# Patient Record
Sex: FEMALE | Race: WHITE | Employment: FULL TIME | URBAN - METROPOLITAN AREA
[De-identification: names, ages, dates, MRNs, and addresses within clinical notes are randomized per-mention and may not be internally consistent; named-entity substitution may affect disease eponyms.]

---

## 2018-02-16 ENCOUNTER — TRANSFERRED RECORDS (OUTPATIENT)
Dept: HEALTH INFORMATION MANAGEMENT | Facility: CLINIC | Age: 48
End: 2018-02-16

## 2018-10-19 ENCOUNTER — TRANSFERRED RECORDS (OUTPATIENT)
Dept: HEALTH INFORMATION MANAGEMENT | Facility: CLINIC | Age: 48
End: 2018-10-19

## 2019-06-26 ENCOUNTER — HOSPITAL ENCOUNTER (OUTPATIENT)
Facility: CLINIC | Age: 49
End: 2019-06-26
Attending: COLON & RECTAL SURGERY | Admitting: COLON & RECTAL SURGERY
Payer: COMMERCIAL

## 2019-09-06 RX ORDER — LIDOCAINE 40 MG/G
CREAM TOPICAL
Status: CANCELLED | OUTPATIENT
Start: 2019-09-06

## 2019-09-06 RX ORDER — ONDANSETRON 2 MG/ML
4 INJECTION INTRAMUSCULAR; INTRAVENOUS
Status: CANCELLED | OUTPATIENT
Start: 2019-09-06

## 2019-09-30 ENCOUNTER — TRANSFERRED RECORDS (OUTPATIENT)
Dept: HEALTH INFORMATION MANAGEMENT | Facility: CLINIC | Age: 49
End: 2019-09-30

## 2019-11-13 ENCOUNTER — TRANSFERRED RECORDS (OUTPATIENT)
Dept: HEALTH INFORMATION MANAGEMENT | Facility: CLINIC | Age: 49
End: 2019-11-13

## 2019-11-14 ENCOUNTER — TELEPHONE (OUTPATIENT)
Dept: SURGERY | Facility: CLINIC | Age: 49
End: 2019-11-14

## 2019-11-14 NOTE — TELEPHONE ENCOUNTER
Writer called and spoke with patient. Patient added to Dr. Yuan's schedule tomorrow 10/15/19 at 3:30pm. Patient is aware that she is be added on to a full clinic and may have a bit of a wait. Patient verbalized understanding.    Ruth Horn LPN

## 2019-11-14 NOTE — TELEPHONE ENCOUNTER
Spoke with Joyce regarding scheduling consult with Dr. Yuan. Offered appt on 11/20/19. Joyce states Dr. Dewitt would like pt seen earlier than this. Explained that we may be able to fit pt in earlier at an alternate location. Request will be made to Dr. Yuan and this team. Nakia CAMARENA RNCC

## 2019-11-14 NOTE — TELEPHONE ENCOUNTER
[facility] Call Center    Phone Message    May a detailed message be left on voicemail: yes    Reason for Call: Other: Joyce from [facility] calling to try an appointment for Jessica with Dr. Yuan as soon as possible. Jessica has a large defect in her scalp from a mohs procedure to remove basal cell skin cancer that was extensive. The final defect size is 5.1 cm by 5.2 cm on Jessica's left frontal scalp. Dr. Gilbert Dewitt says Jessica needs tissue expansion to be able to close. Please give Joyce a call back at [phone] at your earliest convenience to discuss. Joyce was given the fax number to send over records and said she would do so today (11/14/19).      Action Taken: Message routed to:  [facility] ([facility]  Plastics

## 2019-11-15 ENCOUNTER — OFFICE VISIT (OUTPATIENT)
Dept: SURGERY | Facility: CLINIC | Age: 49
End: 2019-11-15
Payer: COMMERCIAL

## 2019-11-15 VITALS — WEIGHT: 128.2 LBS | HEIGHT: 62 IN | BODY MASS INDEX: 23.59 KG/M2

## 2019-11-15 DIAGNOSIS — L98.8 MOHS DEFECT: Primary | ICD-10-CM

## 2019-11-15 DIAGNOSIS — Z98.890 MOHS DEFECT: Primary | ICD-10-CM

## 2019-11-15 PROCEDURE — 99203 OFFICE O/P NEW LOW 30 MIN: CPT | Performed by: PLASTIC SURGERY

## 2019-11-15 RX ORDER — CEFAZOLIN SODIUM 1 G/50ML
1 INJECTION, SOLUTION INTRAVENOUS SEE ADMIN INSTRUCTIONS
Status: CANCELLED | OUTPATIENT
Start: 2019-11-15

## 2019-11-15 RX ORDER — OXYCODONE HYDROCHLORIDE 5 MG/1
5 CAPSULE ORAL EVERY 4 HOURS PRN
COMMUNITY

## 2019-11-15 RX ORDER — CEFAZOLIN SODIUM 2 G/50ML
2 SOLUTION INTRAVENOUS
Status: CANCELLED | OUTPATIENT
Start: 2019-11-15

## 2019-11-15 RX ORDER — TRAZODONE HYDROCHLORIDE 300 MG/1
TABLET ORAL
COMMUNITY

## 2019-11-15 RX ORDER — SULFAMETHOXAZOLE AND TRIMETHOPRIM 200; 40 MG/5ML; MG/5ML
10 SUSPENSION ORAL 2 TIMES DAILY
COMMUNITY

## 2019-11-15 ASSESSMENT — MIFFLIN-ST. JEOR: SCORE: 1164.76

## 2019-11-15 NOTE — LETTER
11/15/2019         RE: Jessica Nickerson  [address] [place] [id number]        Dear Colleague,    Thank you for referring your patient, Jessica Nickerson, to the [facility]. Please see a copy of my visit note below.    PRESENTING COMPLAINT:  Forehead Mohs defect.      HISTORY OF PRESENTING COMPLAINT:  Ms. Nickerson is 48 years old.  She had Mohs surgery to her mid upper forehead on Wednesday for basal cell carcinoma.  She has a large area of opening and here to have it reconstructed.      PAST MEDICAL HISTORY:  Nil.      PAST SURGICAL HISTORY:  Breast reduction, abdominoplasty, T&A and wisdom teeth extraction.      MEDICATIONS:  Oxycodone, Bactrim and trazodone.      ALLERGIES:  Nil.      SOCIAL HISTORY:  Smokes about 1/2 pack a month.  Used to smoke a pack a day for about 20 years, quit in 08/2019.  Does not drink alcohol.  Lives in [place], is a manager.      REVIEW OF SYSTEMS:  Denies chest pain, shortness of breath, MI, CVA, DVT and PE.      PHYSICAL EXAMINATION:  Vital signs are stable.  She is afebrile, in no obvious distress.  She is 5 feet 2 inches, 128 pounds, BMI 23 kg/m2.  On examination of her forehead, she has about a 6 x 6 cm rounded defect in the frontal hairline just left of midline onto the forehead as well.  Underlying pericranium and frontalis muscle is visible.  No infection.      ASSESSMENT AND PLAN:  Based on above findings, a diagnosis of forehead Mohs defect was made.  In my opinion, the best course of action is to proceed with a full-thickness graft from her right groin through her abdominoplasty incision and get the wound healed, then in about 3-6 months proceed with expansion of her scalp to get hair bearing scalp into the area by removing the skin graft.  This staged process was explained in detail.  Other options include a scalp flap now with a donor site skin graft and the same procedure as discussed above down the road.  She wants to proceed with a skin graft  and then subsequent scalp expansion.  All risks, benefits, alternatives of the procedure including pain, infection, bleeding, scarring, asymmetry, seromas, hematomas, loss of the graft, requirement of further surgeries, prominent scar, hypertrophic scar, keloid scar, numbness, asymmetry of her brows and forehead, DVT, PE, MI, CVA, pneumonia, renal failure and death were explained.  We will add her on to Monday.  All questions were answered.  She was happy with the visit.      Total time spent with patient 30 minutes, more than half was counseling.         Again, thank you for allowing me to participate in the care of your patient.        Sincerely,        MICHELINE Yuan MD

## 2019-11-15 NOTE — NURSING NOTE
Jessica Nickerson's goals for this visit include:   Chief Complaint   Patient presents with     Consult     MOH'S procedure, open wound       She requests these members of her care team be copied on today's visit information: no    PCP: No Ref-Primary, Physician    Referring Provider:  No referring provider defined for this encounter.    There were no vitals taken for this visit.    Do you need any medication refills at today's visit? No    Ruth Horn LPN

## 2019-11-16 ENCOUNTER — ANESTHESIA EVENT (OUTPATIENT)
Dept: SURGERY | Facility: AMBULATORY SURGERY CENTER | Age: 49
End: 2019-11-16

## 2019-11-16 NOTE — ANESTHESIA PREPROCEDURE EVALUATION
"Anesthesia Pre-Procedure Evaluation    Patient: Jessica Nickerson   MRN:     [id number] Gender:   female   Age:    48 year old :      1970        Preoperative Diagnosis: Mohs defect [M95.9]   Procedure(s):  FOREHEAD WOUND CLOSURE WITH SKIN GRAFT (RIGHT GROIN)     No past medical history on file.   No past surgical history on file.                PHYSICAL EXAM:   Mental Status/Neuro: A/A/O   Airway: Facies: Feasible  Mallampati: I  Mouth/Opening: Full  TM distance: > 6 cm  Neck ROM: Full   Respiratory: Auscultation: CTAB     Resp. Rate: Normal     Resp. Effort: Normal      CV: Rhythm: Regular  Rate: Age appropriate  Heart: Normal Sounds  Edema: None   Comments:      Dental: Normal Dentition                LABS:  CBC: No results found for: WBC, HGB, HCT, PLT  BMP: No results found for: NA, POTASSIUM, CHLORIDE, CO2, BUN, CR, GLC  COAGS: No results found for: PTT, INR, FIBR  POC: No results found for: BGM, HCG, HCGS  OTHER: No results found for: PH, LACT, A1C, GEMA, PHOS, MAG, ALBUMIN, PROTTOTAL, ALT, AST, GGT, ALKPHOS, BILITOTAL, BILIDIRECT, LIPASE, AMYLASE, SHADI, TSH, T4, T3, CRP, SED     Preop Vitals    BP Readings from Last 3 Encounters:   No data found for BP    Pulse Readings from Last 3 Encounters:   No data found for Pulse      Resp Readings from Last 3 Encounters:   No data found for Resp    SpO2 Readings from Last 3 Encounters:   No data found for SpO2      Temp Readings from Last 1 Encounters:   No data found for Temp    Ht Readings from Last 1 Encounters:   11/15/19 1.575 m (5' 2\")      Wt Readings from Last 1 Encounters:   11/15/19 58.2 kg (128 lb 3.2 oz)    Estimated body mass index is 23.45 kg/m  as calculated from the following:    Height as of 11/15/19: 1.575 m (5' 2\").    Weight as of 11/15/19: 58.2 kg (128 lb 3.2 oz).     LDA:        Assessment:   ASA SCORE: 2    H&P: History and physical reviewed and following examination; no interval change.    NPO Status: NPO Appropriate     Plan:   Anes. " Type:  General   Pre-Medication: None   Induction:  IV (Standard)   Airway: LMA   Access/Monitoring: PIV   Maintenance: Balanced     Postop Plan:   Postop Pain: Opioids  Postop Sedation/Airway: Not planned  Disposition: Outpatient     PONV Management:   Adult Risk Factors: Female, Postop Opioids   Prevention: Ondansetron, Dexamethasone     CONSENT: Direct conversation   Plan and risks discussed with: Patient                      Sg Marsh MD     ANESTHESIA PREOP EVALUATION    PROCEDURE: Procedure(s):  FOREHEAD WOUND CLOSURE WITH SKIN GRAFT (RIGHT GROIN)    HPI: Jessica Nickerson is a 48 year old female who presents for above procedure 2/2 mohs defect.     PAST MEDICAL HISTORY:    No past medical history on file.    PAST SURGICAL HISTORY:    No past surgical history on file.    SOCIAL HISTORY:       Social History     Tobacco Use     Smoking status: Not on file   Substance Use Topics     Alcohol use: Not on file       ALLERGIES:     No Known Allergies    MEDICATIONS:     (Not in a hospital admission)      Current Outpatient Medications   Medication Sig Dispense Refill     oxyCODONE (OXY-IR) 5 MG capsule Take 5 mg by mouth every 4 hours as needed for severe pain       sulfamethoxazole-trimethoprim (BACTRIM/SEPTRA) 8 mg/mL suspension Take 10 mg/kg/day by mouth 2 times daily       traZODone HCl 300 MG TABS          Current Outpatient Medications Ordered in Epic   Medication Sig Dispense Refill     oxyCODONE (OXY-IR) 5 MG capsule Take 5 mg by mouth every 4 hours as needed for severe pain       sulfamethoxazole-trimethoprim (BACTRIM/SEPTRA) 8 mg/mL suspension Take 10 mg/kg/day by mouth 2 times daily       traZODone HCl 300 MG TABS        No current Epic-ordered facility-administered medications on file.        PHYSICAL EXAM:    Vitals: T Data Unavailable, P Data Unavailable, BP Data Unavailable, R Data Unavailable, SpO2  , Weight   Wt Readings from Last 2 Encounters:   11/15/19 58.2 kg (128 lb 3.2 oz)       See doc  flowsheet    NPO STATUS: see doc flowsheet    LABS:    BMP:  No results for input(s): NA, POTASSIUM, CHLORIDE, CO2, BUN, CR, GLC, GEMA in the last 90985 hours.    LFTs:   No results for input(s): PROTTOTAL, ALBUMIN, BILITOTAL, ALKPHOS, AST, ALT, BILIDIRECT in the last 91225 hours.    CBC:   No results for input(s): WBC, RBC, HGB, HCT, MCV, MCH, MCHC, RDW, PLT in the last 19629 hours.    Coags:  No results for input(s): INR, PTT, FIBR in the last 85182 hours.    Imaging:  No orders to display       Sg Marsh MD  Anesthesiology Staff  Pager [phone]    11/16/2019  10:49 AM

## 2019-11-16 NOTE — PROGRESS NOTES
PRESENTING COMPLAINT:  Forehead Mohs defect.      HISTORY OF PRESENTING COMPLAINT:  Ms. Nickerson is 48 years old.  She had Mohs surgery to her mid upper forehead on Wednesday for basal cell carcinoma.  She has a large area of opening and here to have it reconstructed.      PAST MEDICAL HISTORY:  Nil.      PAST SURGICAL HISTORY:  Breast reduction, abdominoplasty, T&A and wisdom teeth extraction.      MEDICATIONS:  Oxycodone, Bactrim and trazodone.      ALLERGIES:  Nil.      SOCIAL HISTORY:  Smokes about 1/2 pack a month.  Used to smoke a pack a day for about 20 years, quit in 08/2019.  Does not drink alcohol.  Lives in [place], is a manager.      REVIEW OF SYSTEMS:  Denies chest pain, shortness of breath, MI, CVA, DVT and PE.      PHYSICAL EXAMINATION:  Vital signs are stable.  She is afebrile, in no obvious distress.  She is 5 feet 2 inches, 128 pounds, BMI 23 kg/m2.  On examination of her forehead, she has about a 6 x 6 cm rounded defect in the frontal hairline just left of midline onto the forehead as well.  Underlying pericranium and frontalis muscle is visible.  No infection.      ASSESSMENT AND PLAN:  Based on above findings, a diagnosis of forehead Mohs defect was made.  In my opinion, the best course of action is to proceed with a full-thickness graft from her right groin through her abdominoplasty incision and get the wound healed, then in about 3-6 months proceed with expansion of her scalp to get hair bearing scalp into the area by removing the skin graft.  This staged process was explained in detail.  Other options include a scalp flap now with a donor site skin graft and the same procedure as discussed above down the road.  She wants to proceed with a skin graft and then subsequent scalp expansion.  All risks, benefits, alternatives of the procedure including pain, infection, bleeding, scarring, asymmetry, seromas, hematomas, loss of the graft, requirement of further surgeries, prominent scar,  hypertrophic scar, keloid scar, numbness, asymmetry of her brows and forehead, DVT, PE, MI, CVA, pneumonia, renal failure and death were explained.  We will add her on to Monday.  All questions were answered.  She was happy with the visit.      Total time spent with patient 30 minutes, more than half was counseling.

## 2019-11-18 ENCOUNTER — HOSPITAL ENCOUNTER (OUTPATIENT)
Facility: AMBULATORY SURGERY CENTER | Age: 49
End: 2019-11-18
Attending: PLASTIC SURGERY
Payer: COMMERCIAL

## 2019-11-18 ENCOUNTER — TELEPHONE (OUTPATIENT)
Dept: SURGERY | Facility: CLINIC | Age: 49
End: 2019-11-18

## 2019-11-18 ENCOUNTER — ANESTHESIA (OUTPATIENT)
Dept: SURGERY | Facility: AMBULATORY SURGERY CENTER | Age: 49
End: 2019-11-18

## 2019-11-18 VITALS
BODY MASS INDEX: 23.74 KG/M2 | WEIGHT: 129 LBS | HEART RATE: 89 BPM | SYSTOLIC BLOOD PRESSURE: 119 MMHG | OXYGEN SATURATION: 95 % | RESPIRATION RATE: 14 BRPM | DIASTOLIC BLOOD PRESSURE: 67 MMHG | HEIGHT: 62 IN | TEMPERATURE: 98.2 F

## 2019-11-18 LAB
HCG UR QL: NEGATIVE
INTERNAL QC OK POCT: YES

## 2019-11-18 RX ORDER — ONDANSETRON 4 MG/1
4 TABLET, ORALLY DISINTEGRATING ORAL EVERY 30 MIN PRN
Status: DISCONTINUED | OUTPATIENT
Start: 2019-11-18 | End: 2019-11-19 | Stop reason: HOSPADM

## 2019-11-18 RX ORDER — CEFAZOLIN SODIUM 1 G/50ML
1 SOLUTION INTRAVENOUS SEE ADMIN INSTRUCTIONS
Status: DISCONTINUED | OUTPATIENT
Start: 2019-11-18 | End: 2019-11-18 | Stop reason: HOSPADM

## 2019-11-18 RX ORDER — PROPOFOL 10 MG/ML
INJECTION, EMULSION INTRAVENOUS CONTINUOUS PRN
Status: DISCONTINUED | OUTPATIENT
Start: 2019-11-18 | End: 2019-11-18

## 2019-11-18 RX ORDER — PROPOFOL 10 MG/ML
INJECTION, EMULSION INTRAVENOUS PRN
Status: DISCONTINUED | OUTPATIENT
Start: 2019-11-18 | End: 2019-11-18

## 2019-11-18 RX ORDER — FENTANYL CITRATE 50 UG/ML
50 INJECTION, SOLUTION INTRAMUSCULAR; INTRAVENOUS ONCE
Status: COMPLETED | OUTPATIENT
Start: 2019-11-18 | End: 2019-11-18

## 2019-11-18 RX ORDER — LIDOCAINE 40 MG/G
CREAM TOPICAL
Status: DISCONTINUED | OUTPATIENT
Start: 2019-11-18 | End: 2019-11-18 | Stop reason: HOSPADM

## 2019-11-18 RX ORDER — SODIUM CHLORIDE, SODIUM LACTATE, POTASSIUM CHLORIDE, CALCIUM CHLORIDE 600; 310; 30; 20 MG/100ML; MG/100ML; MG/100ML; MG/100ML
INJECTION, SOLUTION INTRAVENOUS CONTINUOUS
Status: DISCONTINUED | OUTPATIENT
Start: 2019-11-18 | End: 2019-11-18 | Stop reason: HOSPADM

## 2019-11-18 RX ORDER — ONDANSETRON 2 MG/ML
4 INJECTION INTRAMUSCULAR; INTRAVENOUS EVERY 30 MIN PRN
Status: DISCONTINUED | OUTPATIENT
Start: 2019-11-18 | End: 2019-11-19 | Stop reason: HOSPADM

## 2019-11-18 RX ORDER — FENTANYL CITRATE 50 UG/ML
25-50 INJECTION, SOLUTION INTRAMUSCULAR; INTRAVENOUS
Status: DISCONTINUED | OUTPATIENT
Start: 2019-11-18 | End: 2019-11-18 | Stop reason: HOSPADM

## 2019-11-18 RX ORDER — HYDROMORPHONE HYDROCHLORIDE 1 MG/ML
.3-.5 INJECTION, SOLUTION INTRAMUSCULAR; INTRAVENOUS; SUBCUTANEOUS EVERY 10 MIN PRN
Status: DISCONTINUED | OUTPATIENT
Start: 2019-11-18 | End: 2019-11-19 | Stop reason: HOSPADM

## 2019-11-18 RX ORDER — CEFAZOLIN SODIUM 2 G/50ML
2 SOLUTION INTRAVENOUS
Status: DISCONTINUED | OUTPATIENT
Start: 2019-11-18 | End: 2019-11-18 | Stop reason: HOSPADM

## 2019-11-18 RX ORDER — SODIUM CHLORIDE, SODIUM LACTATE, POTASSIUM CHLORIDE, CALCIUM CHLORIDE 600; 310; 30; 20 MG/100ML; MG/100ML; MG/100ML; MG/100ML
INJECTION, SOLUTION INTRAVENOUS CONTINUOUS PRN
Status: DISCONTINUED | OUTPATIENT
Start: 2019-11-18 | End: 2019-11-18

## 2019-11-18 RX ORDER — MEPERIDINE HYDROCHLORIDE 25 MG/ML
12.5 INJECTION INTRAMUSCULAR; INTRAVENOUS; SUBCUTANEOUS
Status: DISCONTINUED | OUTPATIENT
Start: 2019-11-18 | End: 2019-11-19 | Stop reason: HOSPADM

## 2019-11-18 RX ORDER — FENTANYL CITRATE 50 UG/ML
25-50 INJECTION, SOLUTION INTRAMUSCULAR; INTRAVENOUS
Status: DISCONTINUED | OUTPATIENT
Start: 2019-11-18 | End: 2019-11-19 | Stop reason: HOSPADM

## 2019-11-18 RX ORDER — OXYCODONE HYDROCHLORIDE 5 MG/1
5 TABLET ORAL EVERY 4 HOURS PRN
Status: DISCONTINUED | OUTPATIENT
Start: 2019-11-18 | End: 2019-11-19 | Stop reason: HOSPADM

## 2019-11-18 RX ORDER — SODIUM CHLORIDE, SODIUM LACTATE, POTASSIUM CHLORIDE, CALCIUM CHLORIDE 600; 310; 30; 20 MG/100ML; MG/100ML; MG/100ML; MG/100ML
INJECTION, SOLUTION INTRAVENOUS CONTINUOUS
Status: DISCONTINUED | OUTPATIENT
Start: 2019-11-18 | End: 2019-11-19 | Stop reason: HOSPADM

## 2019-11-18 RX ORDER — ONDANSETRON 2 MG/ML
INJECTION INTRAMUSCULAR; INTRAVENOUS PRN
Status: DISCONTINUED | OUTPATIENT
Start: 2019-11-18 | End: 2019-11-18

## 2019-11-18 RX ORDER — LIDOCAINE HYDROCHLORIDE 20 MG/ML
INJECTION, SOLUTION INFILTRATION; PERINEURAL PRN
Status: DISCONTINUED | OUTPATIENT
Start: 2019-11-18 | End: 2019-11-18

## 2019-11-18 RX ORDER — FENTANYL CITRATE 50 UG/ML
INJECTION, SOLUTION INTRAMUSCULAR; INTRAVENOUS PRN
Status: DISCONTINUED | OUTPATIENT
Start: 2019-11-18 | End: 2019-11-18

## 2019-11-18 RX ORDER — OXYCODONE HYDROCHLORIDE 5 MG/1
5 TABLET ORAL ONCE
Status: COMPLETED | OUTPATIENT
Start: 2019-11-18 | End: 2019-11-18

## 2019-11-18 RX ORDER — BUPIVACAINE HYDROCHLORIDE 2.5 MG/ML
INJECTION, SOLUTION INFILTRATION; PERINEURAL PRN
Status: DISCONTINUED | OUTPATIENT
Start: 2019-11-18 | End: 2019-11-18 | Stop reason: HOSPADM

## 2019-11-18 RX ORDER — NALOXONE HYDROCHLORIDE 0.4 MG/ML
.1-.4 INJECTION, SOLUTION INTRAMUSCULAR; INTRAVENOUS; SUBCUTANEOUS
Status: DISCONTINUED | OUTPATIENT
Start: 2019-11-18 | End: 2019-11-19 | Stop reason: HOSPADM

## 2019-11-18 RX ORDER — DEXAMETHASONE SODIUM PHOSPHATE 4 MG/ML
INJECTION, SOLUTION INTRA-ARTICULAR; INTRALESIONAL; INTRAMUSCULAR; INTRAVENOUS; SOFT TISSUE PRN
Status: DISCONTINUED | OUTPATIENT
Start: 2019-11-18 | End: 2019-11-18

## 2019-11-18 RX ORDER — EPHEDRINE SULFATE 50 MG/ML
INJECTION, SOLUTION INTRAMUSCULAR; INTRAVENOUS; SUBCUTANEOUS PRN
Status: DISCONTINUED | OUTPATIENT
Start: 2019-11-18 | End: 2019-11-18

## 2019-11-18 RX ORDER — MINERAL OIL
OIL (ML) MISCELLANEOUS PRN
Status: DISCONTINUED | OUTPATIENT
Start: 2019-11-18 | End: 2019-11-18 | Stop reason: HOSPADM

## 2019-11-18 RX ADMIN — FENTANYL CITRATE 50 MCG: 50 INJECTION, SOLUTION INTRAMUSCULAR; INTRAVENOUS at 13:56

## 2019-11-18 RX ADMIN — EPHEDRINE SULFATE 10 MG: 50 INJECTION, SOLUTION INTRAMUSCULAR; INTRAVENOUS; SUBCUTANEOUS at 12:27

## 2019-11-18 RX ADMIN — FENTANYL CITRATE 50 MCG: 50 INJECTION, SOLUTION INTRAMUSCULAR; INTRAVENOUS at 12:16

## 2019-11-18 RX ADMIN — PROPOFOL 150 MCG/KG/MIN: 10 INJECTION, EMULSION INTRAVENOUS at 12:16

## 2019-11-18 RX ADMIN — EPHEDRINE SULFATE 10 MG: 50 INJECTION, SOLUTION INTRAMUSCULAR; INTRAVENOUS; SUBCUTANEOUS at 12:23

## 2019-11-18 RX ADMIN — Medication 100 MCG: at 12:28

## 2019-11-18 RX ADMIN — DEXAMETHASONE SODIUM PHOSPHATE 4 MG: 4 INJECTION, SOLUTION INTRA-ARTICULAR; INTRALESIONAL; INTRAMUSCULAR; INTRAVENOUS; SOFT TISSUE at 12:16

## 2019-11-18 RX ADMIN — LIDOCAINE HYDROCHLORIDE 60 MG: 20 INJECTION, SOLUTION INFILTRATION; PERINEURAL at 12:16

## 2019-11-18 RX ADMIN — OXYCODONE HYDROCHLORIDE 5 MG: 5 TABLET ORAL at 13:47

## 2019-11-18 RX ADMIN — PROPOFOL 200 MG: 10 INJECTION, EMULSION INTRAVENOUS at 12:16

## 2019-11-18 RX ADMIN — ONDANSETRON 4 MG: 2 INJECTION INTRAMUSCULAR; INTRAVENOUS at 12:16

## 2019-11-18 RX ADMIN — FENTANYL CITRATE 50 MCG: 50 INJECTION, SOLUTION INTRAMUSCULAR; INTRAVENOUS at 13:46

## 2019-11-18 RX ADMIN — FENTANYL CITRATE 50 MCG: 50 INJECTION, SOLUTION INTRAMUSCULAR; INTRAVENOUS at 12:42

## 2019-11-18 RX ADMIN — SODIUM CHLORIDE, SODIUM LACTATE, POTASSIUM CHLORIDE, CALCIUM CHLORIDE: 600; 310; 30; 20 INJECTION, SOLUTION INTRAVENOUS at 12:12

## 2019-11-18 ASSESSMENT — MIFFLIN-ST. JEOR: SCORE: 1168.52

## 2019-11-18 NOTE — OP NOTE
Procedure Date: 11/18/2019      PREOPERATIVE DIAGNOSIS:  Basal cell carcinoma of the left upper forehead/scalp region requiring Mohs surgery with a defect of skin and subcutaneous tissues down to underlying muscle and pericranium.      POSTOPERATIVE DIAGNOSIS:  Basal cell carcinoma of the left upper forehead/scalp region requiring Mohs surgery with a defect of skin and subcutaneous tissues down to underlying muscle and pericranium.      PROCEDURES:   1.  Full-thickness skin graft from the right lower abdominal area to forehead measuring about 6 x 5 cm.   2.  Preparation of wound bed of the scalp in preparation for full-thickness skin graft with excision of biofilm layer, granulation tissue, skin and subcutaneous tissues.  Defect measuring 6 x 5 cm.      SURGEON:  MRAK Yuan MD      RESIDENT:  Oscar Sexton MD      ANESTHESIA:  General anesthesia with LMA.      COMPLICATIONS:  Nil.      DRAINS:  Nil.      SPECIMENS:  Nil.      BLOOD LOSS:  2 mL.      DESCRIPTION OF PROCEDURE:  After informed consent was taken from the patient and the proper site and procedure was ascertained with her and she was appropriately marked, she was taken to the operating room.  She was placed in a supine position with the knees comfortably flexed, pillows underneath them and pneumoboots placed and running prior to induction of anesthesia.  Preoperative antibiotics were given in the OR.  All pressure points were appropriately padded.  General anesthesia was administered without any complications.  She was prepped and draped in a standard surgical fashion.  The patient had a 6 x 5 cm defect on the forehead/frontal scalp region with underlying subcutaneous tissues/muscle/fascia and pericranium exposed in different areas.  I went ahead and prepared the area with a sharp blade, excising the fatty tissue and some of the fascia to expose underlying pericranium and muscle.  I then turned my attention to the right lower abdominal  region where the patient had already had a scar from a prior abdominoplasty.  I measured out the appropriate amount of skin that was needed, made it into an ellipse to encompass the scar and then cut out the skin.  I ensured hemostasis and closed the donor site with 2-0 Monocryl suture in a deep dermal layer followed by 3-0 Monocryl suture in a running intracuticular manner followed by Steri-Strips and surgical glue.  The skin was then defatted appropriately and then pie crusted.  It was then cut to shape, placed over the defect and sewn in place with 3-0 chromic suture, keeping them long.  I then covered the defect with Xeroform, mineral oil soaked cotton balls, covered over those with the Xeroform and then used the long sutures as a bolster suture to hold this dressing in place.  The patient tolerated the procedure well.  All counts were correct at the end of the case.  A wrap was placed over the scalp.  Marcaine 0.25% plain was injected in all the areas.  The patient was extubated and sent to recovery room in stable condition.         MICHELINE RODRIGUEZ MD             D: 2019   T: 2019   MT: julian      Name:     SAV DONOHUE   MRN:      [id number]        Account:        [id number]   :      1970           Procedure Date: 2019      Document: [id number]

## 2019-11-18 NOTE — DISCHARGE INSTRUCTIONS
SKIN GRAFT POST-OPERATIVE INSTRUCTIONS    Instructions     Have someone drive you home after surgery and help you at home for 1-2     days.     Get plenty of rest and follow balanced diet.     Decreased activity may promote constipation, so you may want to add     more raw fruit to your diet, and be sure to increase fluid intake.    Take pain medication as prescribed. Do not take aspirin or any products     containing aspirin unless approved by your surgeon.     Do not drink alcohol when taking pain medications.     Even when not taking pain medications, no alcohol for 3 weeks as it     causes fluid retention.     If you are taking vitamins with iron, resume these as tolerated.     Do not smoke, as smoking delays healing and increases the risk of     Complications.    Activities     It depends upon where on your body the skin graft was performed.  The area involved     should remain immobile to allow proper healing of the graft.     Do not drive until you are no longer taking narcotics and have been approved to drive      by your surgeon.     No lifting greater than 5-10 pounds for the first 2-3 weeks.    Skin Graft Site Care     Keep the VAC dressing (if used) in place without changing the foam dressing,                  cannister, or settings until your clinic visit.     Keep the VAC dressing plugged to the outlet when possible to prevent loss of                  battery power.     If the VAC dressing leaks or loses suction, please do not take down the dressing.      Rather, try to find the leak and patch it with the adhesive dressing and reseal the             suction.  If unable to do so, call the clinic or hospital (numbers at the end of this               document).      If a bolster dressing is used instead of the VAC dressing, keep it in place until your          clinic visit.    Donor Site Care     Keep the surgical dressing in place until the clinic visit.     If it leaks, try to reseal it with the  adhesive tape provided. Leakage is very common         and not worrisome, rather it is a common nuisance. Alternatively, you may wrap the        donor site (if on the extremity) with an ACE bandage.     The donor site will heal in about 1-3 weeks and when no longer weepy, you may start      using moisturizing cream on the site twice daily for about 3 months.    What to Expect     Some swelling and bruising     Slight bleeding     Pain and soreness at sites    When to Call:     If there is leakage of the VAC dressing or constant beeping from the machine and you       cannot take care of it.     If there is constant major leakage from the donor site and you cannot control it.     If you have increased swelling or bruising.     If swelling and redness persist for a few days.     If you have severe or increased pain not relieved by medication.     If you have any side effects to medications; such as, rash, nausea,      headache, vomiting or constipation.     If you have an oral temperature over 100.4 degrees.     If you have any yellowish or greenish drainage from the incisions or      notice a foul odor.     If you develop increased pain in your calves, shortness of breath, or chest     pain.     If you develop any symptoms of concern.     For Medical Questions, Please Call:      [phone], Monday - Friday, 8 a.m. - 4:30 p.m.      After hours and on weekends, call Hospital Paging at [phone] and       ask for the Plastic Surgeon on call.    [facility]  Home Care Following Anesthesia  For 24 hours after surgery:  1. Get plenty of rest.  A responsible adult must stay with you for at least 24 hours after you leave the surgery center.  2. Do not drive or use heavy equipment.  If you have weakness or tingling, don't drive or use heavy equipment until this feeling goes away.   3. Do not drink alcohol.   4. Avoid strenuous or risky activities.  Ask for help when climbing  "stairs.  5. You may feel lightheaded.  IF so, sit for a few minutes before standing.  Have someone help you get up.   6. If you have nausea (feel sick to your stomach): Drink only clear liquids such as apple juice, ginger ale, broth or 7-Up.  Rest may also help.  Be sure to drink enough fluids.  Move to a regular diet as you feel able.   7. You may have a slight fever.  Call the doctor if your fever is over 100 F (37.7 C) (taken under the tongue) or lasts longer than 24 hours.  8. You may have a dry mouth, a sore throat, muscle aches or trouble sleeping. These should go away after 24 hours.  9. Do not make important or legal decisions.   If you use hormonal birth control (such as the pill, patch, ring or implants):  You will need a second form of birth control for 7 days (condoms, a diaphragm or contraceptive foam).  While in the surgery center, you received a medicine called Sugammadex.  Hormonal birth control (such as the pill, patch, ring or implants) will not work as well for a week after taking this medicine.  Today you received a Marcaine or bupivacaine block to numb the nerves near your surgery site.  This is a block using local anesthetic or \"numbing\" medication injected around the nerves to anesthetize or \"numb\" the area supplied by those nerves.  This block is injected into the muscle layer near your surgical site.  The medication may numb the location where you had surgery for 6-18 hours, but may last up to 24 hours.  If your surgical site is an arm or leg you should be careful with your affected limb, since it is possible to injure your limb without being aware of it due to the numbing.  Until full feeling returns, you should guard against bumping or hitting your limb, and avoid extreme hot or cold temperatures on the skin.  As the block wears off, the feeling will return as a tingling or prickly sensation near your surgical site.  You will experience more discomfort from your incision as the feeling " returns.  You may want to take a pain pill (a narcotic or Tylenol if this was prescribed by your surgeon) when you start to experience mild pain before the pain beccomes more severe.  If your pain medications do not control your pain you should notifiy your surgeon.    Tips for taking pain medications  To get the best pain relief possible, remember these points:    Take pain medications as directed, before pain becomes severe.    Pain medication can upset your stomach: taking it with food may help.    Constipation is a common side effect of pain medication. Drink plenty of  fluids.    Eat foods high in fiber. Take a stool softener if recommended by your doctor or pharmacist.    Do not drink alcohol, drive or operate machinery while taking pain medications.    Ask about other ways to control pain, such as with heat, ice or relaxation.    Tylenol/Acetaminophen Consumption  To help encourage the safe use of acetaminophen, the makers of TYLENOL  have lowered the maximum daily dose for single-ingredient Extra Strength TYLENOL  (acetaminophen) products sold in the [place] from 8 pills per day (4,000 mg) to 6 pills per day (3,000 mg). The dosing interval has also changed from 2 pills every 4-6 hours to 2 pills every 6 hours.    If you feel your pain relief is insufficient, you may take Tylenol/Acetaminophen in addition to your narcotic pain medication.     Be careful not to exceed 3,000 mg of Tylenol/Acetaminophen in a 24 hour period from all sources.    If you are taking extra strength Tylenol/acetaminophen (500 mg), the maximum dose is 6 tablets in 24 hours.    If you are taking regular strength acetaminophen (325 mg), the maximum dose is 9 tablets in 24 hours.    Call a doctor for any of the followin. Signs of infection (fever, growing tenderness at the surgery site, a large amount of drainage or bleeding, severe pain, foul-smelling drainage, redness, swelling).  2. It has been over 8 to 10 hours since surgery and you  are still not able to urinate (pass water).  3. Headache for over 24 hours.  4. Numbness, tingling or weakness the day after surgery (if you had spinal anesthesia).  Your doctor is:  Dr. Chata Yuan, Plastic Surgery: [phone]                    Or dial [phone] and ask for the resident on call for:  Plastics  For emergency care, call the:  [facility]:  [phone] (TTY for hearing impaired: [phone])

## 2019-11-18 NOTE — ANESTHESIA CARE TRANSFER NOTE
Patient: Jessica Nickerson    Procedure(s):  FOREHEAD WOUND CLOSURE WITH FULL THICKNESS SKIN GRAFT (RIGHT GROIN)    Diagnosis: Mohs defect [M95.9]  Diagnosis Additional Information: No value filed.    Anesthesia Type:   General     Note:  Airway :Face Mask  Patient transferred to:PACU  Handoff Report: Identifed the Patient, Identified the Reponsible Provider, Reviewed the pertinent medical history, Discussed the surgical course, Reviewed Intra-OP anesthesia mangement and issues during anesthesia, Set expectations for post-procedure period and Allowed opportunity for questions and acknowledgement of understanding      Vitals: (Last set prior to Anesthesia Care Transfer)    CRNA VITALS  11/18/2019 1250 - 11/18/2019 1327      11/18/2019             Pulse:  111    SpO2:  100 %    Resp Rate (observed):  (!) 1                Electronically Signed By: MICHAEL Lopez CRNA  November 18, 2019  1:27 PM

## 2019-11-18 NOTE — TELEPHONE ENCOUNTER
Pt called, no answer. VM Id's pt. Left detailed message with reason for call and  for pt to call the [facility] back at [phone].    Ruth Horn LPN

## 2019-11-18 NOTE — TELEPHONE ENCOUNTER
----- Message from MICHELINE Yuan MD sent at 11/18/2019 11:36 AM CST -----  Regarding: Add on for Friday  Hi    Please add on for this Friday for wound takedown post-op    Thanks    Chata

## 2019-11-18 NOTE — BRIEF OP NOTE
[facility] Brief Operative Note    Pre-operative diagnosis: Mohs defect [M95.9]   Post-operative diagnosis Same   Procedure: Procedure(s):  FOREHEAD WOUND CLOSURE WITH FULL THICKNESS SKIN GRAFT (RIGHT GROIN)   Surgeon(s): Surgeon(s) and Role:     * MICHELINE Yuan MD - Primary   Estimated blood loss: 2 mL    Specimens: None   Findings: None

## 2019-11-18 NOTE — ANESTHESIA POSTPROCEDURE EVALUATION
Anesthesia POST Procedure Evaluation    Patient: Jessica Nickerson   MRN:     [id number] Gender:   female   Age:    48 year old :      1970        Preoperative Diagnosis: Mohs defect [M95.9]   Procedure(s):  FOREHEAD WOUND CLOSURE WITH FULL THICKNESS SKIN GRAFT (RIGHT GROIN)   Postop Comments: No value filed.       Anesthesia Type:  Not documented  General    Reportable Event: NO     PAIN: Uncomplicated   Sign Out status: Comfortable, Well controlled pain     PONV: No PONV   Sign Out status:  No Nausea or Vomiting     Neuro/Psych: Uneventful perioperative course   Sign Out Status: Preoperative baseline; Age appropriate mentation     Airway/Resp.: Uneventful perioperative course   Sign Out Status: Non labored breathing, age appropriate RR; Resp. Status within EXPECTED Parameters     CV: Uneventful perioperative course   Sign Out status: Appropriate BP and perfusion indices; Appropriate HR/Rhythm     Disposition:   Sign Out in:  PACU  Disposition:  Home; Phase II  Recovery Course: Uneventful  Follow-Up: Not required           Last Anesthesia Record Vitals:  CRNA VITALS  2019 1250 - 2019 1350      2019             Pulse:  111    SpO2:  100 %    Resp Rate (observed):  (!) 1    EKG:  Sinus rhythm          Last PACU Vitals:  Vitals Value Taken Time   /69 2019  2:00 PM   Temp 36.8  C (98.3  F) 2019  2:00 PM   Pulse 89 2019  2:00 PM   Resp 4 2019  2:02 PM   SpO2 93 % 2019  2:02 PM   Temp src     NIBP     Pulse     SpO2     Resp     Temp     Ht Rate     Temp 2     Vitals shown include unvalidated device data.      Electronically Signed By: Sg Marsh MD, 2019   Statement Selected

## 2019-11-22 ENCOUNTER — TELEPHONE (OUTPATIENT)
Dept: SURGERY | Facility: CLINIC | Age: 49
End: 2019-11-22

## 2019-11-22 ENCOUNTER — OFFICE VISIT (OUTPATIENT)
Dept: SURGERY | Facility: CLINIC | Age: 49
End: 2019-11-22
Payer: COMMERCIAL

## 2019-11-22 DIAGNOSIS — L98.8 MOHS DEFECT: Primary | ICD-10-CM

## 2019-11-22 DIAGNOSIS — Z98.890 MOHS DEFECT: Primary | ICD-10-CM

## 2019-11-22 PROCEDURE — 99024 POSTOP FOLLOW-UP VISIT: CPT | Performed by: PLASTIC SURGERY

## 2019-11-22 NOTE — PROGRESS NOTES
Service Date: 2019      PRESENTING COMPLAINT:  Postoperative visit, status post forehead full thickness skin graft from the Mohs defect, reconstruction done 2019.      HISTORY OF PRESENTING COMPLAINT:  Ms. Nickerson is 48 years old.  She is 5 days out from surgery and has done well, no major issues.      PHYSICAL EXAMINATION:  Vital signs are stable.  She is afebrile, in no obvious distress.  Donor site in the right groin is healing in well.  I took down the bolster dressing.  The skin graft has taken well.      ASSESSMENT AND PLAN:  Based on above findings, a diagnosis of forehead reconstruction was made.  Plan is to do Vaseline, Xeroform and dry gauze dressings daily.  I will see her back in 3 weeks.         MICHELINE RODRIGUEZ MD             D: 2019   T: 2019   MT: damaris      Name:     SAV NICKERSON   MRN:      [id number]        Account:      [id number]   :      1970           Service Date: 2019      Document: [id number]

## 2019-11-22 NOTE — NURSING NOTE
Jessica Nickerson's goals for this visit include:   Chief Complaint   Patient presents with     Surgical Followup     post op        She requests these members of her care team be copied on today's visit information: no    PCP: No Ref-Primary, Physician    Referring Provider:  No referring provider defined for this encounter.    There were no vitals taken for this visit.    Do you need any medication refills at today's visit? No    Ruth Horn LPN

## 2019-11-22 NOTE — TELEPHONE ENCOUNTER
Writer called and spoke with patients . He was inquiring if Dr. Yuan is recommending if patient needs to be put on additional antibiotics. Writer spoke with Dr. Yuan and he stated no additional antibiotics needed. Patients  verbalized understanding.    Ruth Horn LPN

## 2019-11-22 NOTE — LETTER
2019         RE: Jessica Nickerson  [address] [place] [id number]        Dear Colleague,    Thank you for referring your patient, Jessica Nickerson, to the [facility]. Please see a copy of my visit note below.    Service Date: 2019      PRESENTING COMPLAINT:  Postoperative visit, status post forehead full thickness skin graft from the Mohs defect, reconstruction done 2019.      HISTORY OF PRESENTING COMPLAINT:  Ms. Nickerson is 48 years old.  She is 5 days out from surgery and has done well, no major issues.      PHYSICAL EXAMINATION:  Vital signs are stable.  She is afebrile, in no obvious distress.  Donor site in the right groin is healing in well.  I took down the bolster dressing.  The skin graft has taken well.      ASSESSMENT AND PLAN:  Based on above findings, a diagnosis of forehead reconstruction was made.  Plan is to do Vaseline, Xeroform and dry gauze dressings daily.  I will see her back in 3 weeks.         MICHELINE YUAN MD             D: 2019   T: 2019   MT: damaris      Name:     JESSICA NICKERSON   MRN:      [id number]        Account:      [id number]   :      1970           Service Date: 2019      Document: [id number]       Again, thank you for allowing me to participate in the care of your patient.        Sincerely,        MICHELINE Yuan MD

## 2019-11-22 NOTE — TELEPHONE ENCOUNTER
[facility] Call Center    Phone Message    May a detailed message be left on voicemail: yes    Reason for Call: Other: patient  called and would like to know if pt should still stay on antibiotics. please advise     Action Taken: Message routed to:  Adult Clinics: Surgery, General p [id number]

## 2019-12-10 ENCOUNTER — OFFICE VISIT (OUTPATIENT)
Dept: ORTHOPEDICS | Facility: CLINIC | Age: 49
End: 2019-12-10
Payer: COMMERCIAL

## 2019-12-10 VITALS — BODY MASS INDEX: 23.74 KG/M2 | HEIGHT: 62 IN | WEIGHT: 129 LBS

## 2019-12-10 DIAGNOSIS — Z94.5 STATUS POST FULL THICKNESS SKIN GRAFT: Primary | ICD-10-CM

## 2019-12-10 PROCEDURE — 99024 POSTOP FOLLOW-UP VISIT: CPT | Performed by: PLASTIC SURGERY

## 2019-12-10 ASSESSMENT — MIFFLIN-ST. JEOR: SCORE: 1168.39

## 2019-12-10 NOTE — PROGRESS NOTES
PRESENTING COMPLAINT:  Postoperative visit, status post forehead full thickness skin graft from the Mohs defect, reconstruction done 11/18/2019.      HISTORY OF PRESENTING COMPLAINT:  Ms. Nickerson is 48 years old.  She is 2 weeks out from surgery and has done well, no major issues.      PHYSICAL EXAMINATION:  Vital signs are stable.  She is afebrile, in no obvious distress.  Donor site in the right groin is healing in well.  The skin graft has taken well.      ASSESSMENT AND PLAN:  Based on above findings, a diagnosis of forehead reconstruction was made.  Plan is to do Vaseline, Xeroform and dry gauze dressings daily.  I will see her back in 3-6 weeks.

## 2019-12-10 NOTE — PATIENT INSTRUCTIONS
Thanks for coming today.  Ortho/Sports Medicine Clinic  [address] [place] [address]    To schedule future appointments in Ortho Clinic, you may call [phone].    To schedule ordered imaging by your provider:   Call Central Imaging [phone]    To schedule an injection ordered by your provider:  Call Central Imaging Injection scheduling line: [phone]  [employer] available online at:  [email or web address]    Please call if any further questions or concerns ([phone]).  Clinic hours 8 am to 5 pm.    Return to clinic (call) if symptoms worsen or fail to improve.

## 2019-12-10 NOTE — LETTER
12/10/2019         RE: Jessica Nickerson  [address] [place] [address]        Dear Colleague,    Thank you for referring your patient, Jessica Nickerson, to the [facility]. Please see a copy of my visit note below.       PRESENTING COMPLAINT:  Postoperative visit, status post forehead full thickness skin graft from the Mohs defect, reconstruction done 11/18/2019.      HISTORY OF PRESENTING COMPLAINT:  Ms. Nickerson is 48 years old.  She is 2 weeks out from surgery and has done well, no major issues.      PHYSICAL EXAMINATION:  Vital signs are stable.  She is afebrile, in no obvious distress.  Donor site in the right groin is healing in well.  The skin graft has taken well.      ASSESSMENT AND PLAN:  Based on above findings, a diagnosis of forehead reconstruction was made.  Plan is to do Vaseline, Xeroform and dry gauze dressings daily.  I will see her back in 3-6 weeks.     Again, thank you for allowing me to participate in the care of your patient.        Sincerely,        MICHELINE Yuan MD

## 2019-12-23 ENCOUNTER — TELEPHONE (OUTPATIENT)
Dept: PEDIATRICS | Facility: CLINIC | Age: 49
End: 2019-12-23

## 2019-12-23 ENCOUNTER — TELEPHONE (OUTPATIENT)
Dept: SURGERY | Facility: CLINIC | Age: 49
End: 2019-12-23

## 2019-12-23 NOTE — TELEPHONE ENCOUNTER
Pt called, no answer. VM Id's pt. Left detailed message with reason for call and  for pt to call the [facility] back at [phone].....Amee Paige RN

## 2019-12-23 NOTE — TELEPHONE ENCOUNTER
Pt states that when she was last in to see  she had mentioned that she was going to be changing insurances and that she needed a PA submitted prior to this and pt states that she talked with  about this and pt states that she thought this was going to be done. RN notified pt that  is not in clinic but that RN would send a message to  to advise on sending a PA. RN advised pt that RN did not think  would be back before the end of the year but RN would send a message to him. Pt verbalized understanding..mAee Paige RN

## 2019-12-23 NOTE — TELEPHONE ENCOUNTER
Good morning.    Patient reached out to me about a PA for a surgery in the future. She seemed pretty anxious. I informed her that I would message Dr. Yuan's team to inquire about this procedure. Patient states that she wouldn't be scheduling until some time in February. I informed her that it is possible that Dr. Yuan would be waiting for follow up during the appointment on 1/14/20 to put a case request in for her surgery. Can someone reach out to the patient to clarify?

## 2019-12-24 NOTE — TELEPHONE ENCOUNTER
A message was sent to the  requesting CPT codes for procedure. Will submit for PA once response is received.

## 2019-12-24 NOTE — TELEPHONE ENCOUNTER
MICHELINE Yuan MD Eastman, Colleen E RN; P [facility] [employer]   Caller: Unspecified (Yesterday,  4:50 PM)             Thanks!     dina Cook for staged scalp expansion and subsequent local tissue rearrangement for cancer defect skin graft reconstruction with alopecia requiring reconstruction to get hair bearing scalp to the area.     Thanks     Chata

## 2019-12-30 NOTE — TELEPHONE ENCOUNTER
Per the [employer] portal. No PA required for codes listed below. Reference # [id number]. Coverage is based on medical necessity.

## 2019-12-30 NOTE — TELEPHONE ENCOUNTER
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  Full thickness skin graft was done 11-18-19 @ [facility] by Dr Yuan.  b)  C44.319  Basal cell carcinoma of skin of other parts of face - left upper forehead.  The defect involves hair bearing scalp as well.     Possible CPT's for staged scalp expansion and subsequent local tissue rearrangement;  a)  00710-82  Insertion of tissue expander(s) for other than breast, including subsequent expansion - to expand the scalp to get hair-bearing scalp to the defect area.  b)  06237-11  Adjacent tissue transfer or rearrangement, scalp; defect 10 sq cm or less  c)  37878-18   Adjacent tissue transfer or rearrangement, scalp; defect 10.1 sq cm to 30.0 sq cm  d)  08749-40 Adjacent tissue transfer or rearrangement, any area; defect 30.1 sq cm to 60.0 sq cm     Thanks,  Abbey

## 2020-01-10 NOTE — TELEPHONE ENCOUNTER
Writer called and spoke with patient regarding PA's. Patient verbalized understanding and stated she will follow up with Dr. Yuan on 1/14/20 in clinic.    Ruth Horn LPN

## 2020-01-14 ENCOUNTER — OFFICE VISIT (OUTPATIENT)
Dept: ORTHOPEDICS | Facility: CLINIC | Age: 50
End: 2020-01-14
Payer: COMMERCIAL

## 2020-01-14 DIAGNOSIS — Z94.5 STATUS POST FULL THICKNESS SKIN GRAFT: Primary | ICD-10-CM

## 2020-01-14 PROCEDURE — 99024 POSTOP FOLLOW-UP VISIT: CPT | Performed by: PLASTIC SURGERY

## 2020-01-14 NOTE — PROGRESS NOTES
PRESENTING COMPLAINT:  Postoperative visit, status post forehead full thickness skin graft from the Mohs defect, reconstruction done 11/18/2019.      HISTORY OF PRESENTING COMPLAINT:  Ms. Nickerson is 48 years old.  She is about 2 months out from surgery and has done well, no major issues.      PHYSICAL EXAMINATION:  Vital signs are stable.  She is afebrile, in no obvious distress.  Donor site in the right groin is healed.  The skin graft has almost healed, a small scab in the middle. No infection.      ASSESSMENT AND PLAN:  Based on above findings, a diagnosis of forehead reconstruction was made.  Plan is to do Vaseline for now. In 6 weeks RTC, plan next stages of reconstruction.

## 2020-01-14 NOTE — LETTER
1/14/2020         RE: Jessica Nickerson  [address] [place] [address]        Dear Colleague,    Thank you for referring your patient, Jessica Nickerson, to the [facility]. Please see a copy of my visit note below.    PRESENTING COMPLAINT:  Postoperative visit, status post forehead full thickness skin graft from the Mohs defect, reconstruction done 11/18/2019.      HISTORY OF PRESENTING COMPLAINT:  Ms. Nickerson is 48 years old.  She is  about 2 months out from surgery and has done well, no major issues.      PHYSICAL EXAMINATION:  Vital signs are stable.  She is afebrile, in no obvious distress.  Donor site in the right groin is healed.  The skin graft has almost healed, a small scab in the middle. No infection.      ASSESSMENT AND PLAN:  Based on above findings, a diagnosis of forehead reconstruction was made.  Plan is to do Vaseline for now. In 6 weeks RTC, plan next stages of reconstruction.      Again, thank you for allowing me to participate in the care of your patient.        Sincerely,        MICHELINE Yuan MD

## 2020-02-21 ENCOUNTER — TELEPHONE (OUTPATIENT)
Dept: ORTHOPEDICS | Facility: CLINIC | Age: 50
End: 2020-02-21

## 2020-02-21 NOTE — TELEPHONE ENCOUNTER
2/21 Called and left voicemail. Explained we need to reschedule appointment on 2/25 with Dr. Yuan he has an urgent surgery he needs do. Provided phone number [phone] to reschedule.      Pat Mukherjee          Procedure    Ortho/Sports Med/Ent/Eye   [facility]   [phone]

## 2020-02-24 NOTE — TELEPHONE ENCOUNTER
2/24 Called and left voicemail. Explained we need to reschedule appointment on 2/25 with Dr. Yuan he has an urgent surgery he needs do. Provided phone number [phone] to reschedule.      Pat Mukherjee          Procedure    [facility] Med[facility]Eye   [facility]   [phone]

## 2020-03-30 ENCOUNTER — TELEPHONE (OUTPATIENT)
Dept: SURGERY | Facility: CLINIC | Age: 50
End: 2020-03-30

## 2020-03-30 NOTE — TELEPHONE ENCOUNTER
[facility] Call Center    Phone Message    May a detailed message be left on voicemail: yes     Reason for Call: The patient stated she had a Mohs procedure by another Provider and then saw Dr. Yuan and needed a skin graft.  The patient stated she needs another Mohs procedure and the area is next to her R eyebrow.  She is requesting a call from Dr. Yuan to be advised on what he thinks would be best for her to do.  Please advise. Thank you.     Action Taken: Message routed to:  Adult Clinics: Surgery, Plastic p [id number]    Travel Screening: Not Applicable

## 2020-03-30 NOTE — TELEPHONE ENCOUNTER
Pt called and states that she was seen at [facility] for her screenings and an area by her right eyebrow was checked and came back as a BCC. Pt states that she was so happy with 's work that she would like him to do another skin graft if needed. RN asked pt if she needs a consult appt with  the mohs surgeon here and pt states that she would like to start there and then be referred to  if needed  .RN advised pt to have records from [facility] faxed to [phone]...Amee Paige RN

## 2020-04-06 ENCOUNTER — TRANSFERRED RECORDS (OUTPATIENT)
Dept: HEALTH INFORMATION MANAGEMENT | Facility: CLINIC | Age: 50
End: 2020-04-06

## 2020-04-15 ENCOUNTER — TRANSFERRED RECORDS (OUTPATIENT)
Dept: HEALTH INFORMATION MANAGEMENT | Facility: CLINIC | Age: 50
End: 2020-04-15

## 2020-05-05 ENCOUNTER — TELEPHONE (OUTPATIENT)
Dept: DERMATOLOGY | Facility: CLINIC | Age: 50
End: 2020-05-05

## 2020-05-05 NOTE — TELEPHONE ENCOUNTER
Pt called, No answer. Left general message for pt to call the [facility] back at [phone]...Amee Paige RN                        Hi Everyone,     Dr. Luna gave my name to this patient for a second opinion skin cancer screening (he repaired a 6cm wound after Mohs from another practice. Maybe she has concerns that care was delayed at the other dermatology practice). We have limited records from [facility] in [place]. She can be scheduled with me at the [facility] or at [place]. I can't tell how urgent the request is. If it doesn't seem like an urgent consult, we could schedule her a video encounter with photos of concerning sites. If it is more urgent, for example if she has symptomatic lesions (bleeding, growing, painful) I can see her face to face.     I have a feeling she'll want to be seen at . May I have someone there contact her to make arrangements? Thank you.   AM    Message text

## 2020-05-05 NOTE — TELEPHONE ENCOUNTER
[facility] Call Center    Phone Message    May a detailed message be left on voicemail: yes     Reason for Call: Other: Pt is requesting a second opinion from Dr. Cervantes. She states she has a hx of 12 different MOHs or other skin surgeries, all with [facility] in [place], [place]. Most recently, she had a MOHs procedure on her scalp, and had reconstructive surgery with Dr. Yuan in plastic surgery. Pt states she is wanting to be re-evaluated and have a full skin check, and to discuss how to check herself for new spots. Please call to discuss.    Action Taken: Message routed to:  [facility] ([facility] Dermatology    Travel Screening: Not Applicable

## 2020-05-05 NOTE — TELEPHONE ENCOUNTER
Pt returned the call and notified of 's message below. Pt states understanding and would prefer to schedule an in person visit as this area by her ear continues scabbing and bleeding. Pt scheduled for an in clinic visit next Wednesday.  Pt also advised to please send over any current records from her previous dermatologist. Pt states understanding and will work on sending them over...Amee Paige RN

## 2020-05-12 ENCOUNTER — TELEPHONE (OUTPATIENT)
Dept: DERMATOLOGY | Facility: CLINIC | Age: 50
End: 2020-05-12

## 2020-05-12 NOTE — TELEPHONE ENCOUNTER
Do you have any of the following symptoms:  a)      Fever (or reported chills) No  b)      Shortness of Breath No  c)      Rash No  d)      Cough in the last 14 days No    If a patient reports yes to any of these symptoms, obtain direction from the provider and call the patient back to let them know if they can come in or not.  1.    Provider needs to determine if this patient should still be seen in clinic.  2.    If decision to not see in clinic, call patient back and refer them to COVID- 19 [email or web address] or schedule COVID-19 phone visit.  3.    Turn in-person visit into telephone visit (FOR RETURN PATIENTS ONLY)    Remind patients that visitors are not allowed on site. Only one legal guardian who screens negative to the above questions will be allowed to accompany patients. If a patient indicates that they will be bringing a legal guardian with them to the appointment please make sure to screen both the patient and the legal guardian for symptoms using the tool above.      Rama Jordan LPN

## 2020-05-13 ENCOUNTER — OFFICE VISIT (OUTPATIENT)
Dept: DERMATOLOGY | Facility: CLINIC | Age: 50
End: 2020-05-13
Payer: COMMERCIAL

## 2020-05-13 DIAGNOSIS — L57.8 SUN-DAMAGED SKIN: ICD-10-CM

## 2020-05-13 DIAGNOSIS — Z85.828 HISTORY OF BASAL CELL CARCINOMA OF SKIN: ICD-10-CM

## 2020-05-13 DIAGNOSIS — D48.5 NEOPLASM OF UNCERTAIN BEHAVIOR OF SKIN: Primary | ICD-10-CM

## 2020-05-13 DIAGNOSIS — L73.8 SEBACEOUS HYPERPLASIA OF FACE: ICD-10-CM

## 2020-05-13 DIAGNOSIS — D18.01 CHERRY ANGIOMA: ICD-10-CM

## 2020-05-13 DIAGNOSIS — L57.0 AK (ACTINIC KERATOSIS): ICD-10-CM

## 2020-05-13 PROCEDURE — 99203 OFFICE O/P NEW LOW 30 MIN: CPT | Mod: 25 | Performed by: DERMATOLOGY

## 2020-05-13 PROCEDURE — 88305 TISSUE EXAM BY PATHOLOGIST: CPT | Mod: TC | Performed by: DERMATOLOGY

## 2020-05-13 PROCEDURE — 11102 TANGNTL BX SKIN SINGLE LES: CPT | Performed by: DERMATOLOGY

## 2020-05-13 PROCEDURE — 11103 TANGNTL BX SKIN EA SEP/ADDL: CPT | Performed by: DERMATOLOGY

## 2020-05-13 RX ORDER — KETOCONAZOLE 20 MG/ML
SHAMPOO TOPICAL
COMMUNITY
Start: 2020-03-18

## 2020-05-13 RX ORDER — DOXYCYCLINE 100 MG/1
CAPSULE ORAL
COMMUNITY
Start: 2020-04-29

## 2020-05-13 NOTE — LETTER
5/13/2020         RE: Jessica Nickerson  [address] [place] [id number]        Dear Colleague,    Thank you for referring your patient, Jessica Nickerson, to the [facility]. Please see a copy of my visit note below.    [facility] Dermatology Note      Dermatology Problem List:  1. BCC-    -Left frontal scalp s/p left frontal scalp- (per pt)    -left shoulder s/p excision-(per pt)    -Right anterior thigh- S/p excision- 2017    -left scapula s/p excision 2017    -Right chest s/p excision 2017   -left trapezius s/p excision 2017   -left frontal scalp- s/p mohs 2019   -right lower forehead s/p mohs 3/2020  2. NUB- Glabella S/p BX 5/13/20   - Right infraorbital cheek s/p Bx 5/13/20    3. Actinic Keratosis   - prior LN2 Treatment   - Plan to start field treatment with Efudex, but may transition to PDT once COVID restrictions lifted.   4. Papular Rosacea   - Doxycycline and Metrocream       CC:   Chief Complaint   Patient presents with     Derm Problem     skin check        Encounter Date: May 13, 2020    History of Present Illness:  Ms. Jessica Nickerson is a 49 year old female who presents in self referral for a second opinion on her history of multiple skin cancers and future management.     She reports having a pre-melanoma, but does not recall being told she had true melanoma. She recalls having an excision, but no lymph node biopsy, immunotherapy, or radiation.     She had a BCC treated on her left frontal scalp years ago. The scar started to develop some telangectasia and it was biopsied when she saw one of her regular dermatologist's partners. It was determiend to be a recurrence. The recurrence BCC required Multiple stages of Mohs to clear. She was referred to plastic surgery for repair of a several cm wound on her left forehead and scalp. A skin graft was placed to heal the wound as a temporizing measure to prepare for tissue expanders, but she is satisfied with the  appearance of the scar and would like to postpone further revision.     She has several rough scaly patches on her cheeks. She had several similar spots treated with liquid nitrogen spray on her lower face, neck, and chest at her last dermatology appointment. Her understanding was that AK's were being treated.     She was also prescribed doxycycline and metrocream to treat her baseline rosacea lesions. She hasn't been using it long, but it seems to be helping some.       She grew up in [place] and had extensive sun exposure.   Patient denies any history of genetic mutations, palmar pits, odontogenic cysts, or other congenital malformations.       Past Medical History:   There is no problem list on file for this patient.    No past medical history on file.  Past Surgical History:   Procedure Laterality Date     REPAIR MOHS Right 11/18/2019    Procedure: FOREHEAD WOUND CLOSURE WITH FULL THICKNESS SKIN GRAFT (RIGHT GROIN);  Surgeon: MICHELINE Yuan MD;  Location:  OR       Social History:  Patient reports that she quit smoking about 8 months ago. She has never used smokeless tobacco.    Family History:  She denies extensive family history of Skin cancer  She denies known genetic mutations.   Her mother was adopted, so family history is incomplete.     Medications:  Current Outpatient Medications   Medication Sig Dispense Refill     doxycycline hyclate (VIBRAMYCIN) 100 MG capsule        ketoconazole (NIZORAL) 2 % external shampoo 1 APPLICATION DAILY TOPICALLY WASH IN SCALP DAILY       metroNIDAZOLE (METROCREAM) 0.75 % external cream        nicotine (NICORETTE) 4 MG lozenge Place 4 mg inside cheek as needed for smoking cessation       oxyCODONE (OXY-IR) 5 MG capsule Take 5 mg by mouth every 4 hours as needed for severe pain       sulfamethoxazole-trimethoprim (BACTRIM/SEPTRA) 8 mg/mL suspension Take 10 mg/kg/day by mouth 2 times daily       traZODone HCl 300 MG TABS        No Known Allergies      Review of  Systems:  -Constitutional: Otherwise feeling well today, in usual state of health.  -HEENT: Patient denies nonhealing oral sores.  -Skin: As above in HPI. No additional skin concerns.    Physical exam:  Vitals: There were no vitals taken for this visit.  GEN: This is a well developed, well-nourished female in no acute distress, in a pleasant mood.    SKIN: Total skin excluding the undergarment areas was performed. The exam included the head/face, neck, both arms, chest, back, abdomen, both legs, digits and/or nails.   -Funez skin type: II  -There is a 2mm pearly pink to skin colored papule on the galbella. Telangectasia present on dermoscopy.   -There is a 2mm pink to skin colored pearly papule on the right infraorbital cheek. Telangectasia present on dermoscopy.    -There is no erythema, telangectasias, nodularity, or pigmentation on previous nmsc surgery scars.  - scattered pink 3-4mm papules on the facial convexities with adjacent telangectasia   - Several patches of gritty scale scattered on bilateral cheeks   -No other lesions of concern on areas examined.     Impression/Plan:  1. Neoplasm of uncertain behavior of skin, glabella and right infraorbital cheek.   Lesions are subtle, but history of multiple skin cancers argues for lower screening threshold.     Rosacea vs Seb Hyperplasia vs BCC vs IDN    - Shave biopsy:  After discussion of benefits and risks including but not limited to bleeding/bruising, pain/swelling, infection, scar, incomplete removal, nerve damage/numbness, recurrence, and non-diagnostic biopsy, written consent, verbal consent and photographs were obtained. Time-out was performed. The area was cleaned with isopropyl alcohol. 0.5ml of 1% lidocaine with 1:100,000 epinephrine was injected to obtain adequate anesthesia. A shave biopsy was performed on each site, the glabella and right infraorbital cheek. Hemostasis was achieved with aluminium chloride. Vaseline and a sterile dressing were  "applied. The patient tolerated the procedure and no complications were noted. The patient was provided with verbal and written post care instructions.    **Update   FINAL DIAGNOSIS:   A. Skin, Glabella, shave:   - Mild epidermal hyperplasia and perifollicular lymphoplasmacytic   infiltrate - (see comment)     B. Skin, Right infraorbital cheek, shave:   - Macular seborrheic keratosis - (see description)     COMMENT:   A.  These features are not entirely diagnostic but appear inflammatory in   nature; they may represent   superficially-sampled acne rosacea.  While there is no evidence of   malignancy on level sections, little dermis   is seen.  Thus clinical follow-up is recommended.    2. Actinic keratosis  - precancerous nature reviewed.   - LN2 treatment is appropriate for lesions that are clinically apparent, but cannot \"get ahead\" of AK's.   - Will start field treatment likely with Efudex after biopsy results available (PDT appointments have been deferred for COVID social distancing).     -  Discussed risks and benefits of PDT including but not limited to discomfort and pain with procedure, time for procedure, need for avoidance of sun exposure after treatment, expected irritation after treatment, risk of exuberant reaction.   - Efudex and chemical peels were briefly discussed including risks and benefits.     3. Rosacea, papular  - Doxycycline and Metronidazole prescribed by prior dermatologist.   - Agree with this treatment for 3 months to reduce distraction during next skin exam.   - Rosacea may be flared by facial field therapy for AK's.      4. History of nonmelanoma skin cancer, no clincial evidence of recurrence  - Sun precaution was advised including the use of sun screens of SPF 30 or higher, sun protective clothing, and avoidance of tanning beds.  - She reports being satisfied with FTSG repair of her Mohs wound.    FTSG can allow for earlier recognition of BCC recurrence compared with local flap.    It " would be reasonable to delay tissue expanders for a couple years to monitor for recurrence.   - Once biopsy results are known, will determined skin cancer screening schedule. Likely q3-4 months for now, but will be guided by incidence.  - Number of skin cancers at her age is likely due to skin type an sun exposure in [place] years ago.   - No clinical indications for genetic screening      CC Dr. Daphne Robison and Dr. Allan Pearson on close of this encounter.  Follow-up pending biopsy results.           Staff Involved:  Staff Only    Scribe Disclosure:   I, Mandy huston [facility], am serving as a scribe to document services personally performed by Dr. Crow Cervantes, based on data collection and the provider's statements to me.     Provider Disclosure:   The documentation recorded by the scribe accurately reflects the services I personally performed and the decisions made by me.  I personally performed the procedures today.    Crow Cervantes DO    Department of Dermatology  [facility]: Phone: [phone], Fax:[phone]  [facility]: Phone: [phone], Fax: [phone]      Again, thank you for allowing me to participate in the care of your patient.        Sincerely,        Crow Cervantes MD

## 2020-05-13 NOTE — PROGRESS NOTES
[facility] Dermatology Note      Dermatology Problem List:  1. BCC-    -Left frontal scalp s/p left frontal scalp- (per pt)    -left shoulder s/p excision-(per pt)    -Right anterior thigh- S/p excision- 2017    -left scapula s/p excision 2017    -Right chest s/p excision 2017   -left trapezius s/p excision 2017   -left frontal scalp- s/p mohs 2019   -right lower forehead s/p mohs 3/2020  2. NUB- Glabella S/p BX 5/13/20   - Right infraorbital cheek s/p Bx 5/13/20    3. Actinic Keratosis   - prior LN2 Treatment   - Plan to start field treatment with Efudex, but may transition to PDT once COVID restrictions lifted.   4. Papular Rosacea   - Doxycycline and Metrocream       CC:   Chief Complaint   Patient presents with     Derm Problem     skin check        Encounter Date: May 13, 2020    History of Present Illness:  Ms. Jessica Nickerson is a 49 year old female who presents in self referral for a second opinion on her history of multiple skin cancers and future management.     She reports having a pre-melanoma, but does not recall being told she had true melanoma. She recalls having an excision, but no lymph node biopsy, immunotherapy, or radiation.     She had a BCC treated on her left frontal scalp years ago. The scar started to develop some telangectasia and it was biopsied when she saw one of her regular dermatologist's partners. It was determiend to be a recurrence. The recurrence BCC required Multiple stages of Mohs to clear. She was referred to plastic surgery for repair of a several cm wound on her left forehead and scalp. A skin graft was placed to heal the wound as a temporizing measure to prepare for tissue expanders, but she is satisfied with the appearance of the scar and would like to postpone further revision.     She has several rough scaly patches on her cheeks. She had several similar spots treated with liquid nitrogen spray on her lower face, neck, and chest at her last dermatology  appointment. Her understanding was that AK's were being treated.     She was also prescribed doxycycline and metrocream to treat her baseline rosacea lesions. She hasn't been using it long, but it seems to be helping some.       She grew up in [place] and had extensive sun exposure.   Patient denies any history of genetic mutations, palmar pits, odontogenic cysts, or other congenital malformations.       Past Medical History:   There is no problem list on file for this patient.    No past medical history on file.  Past Surgical History:   Procedure Laterality Date     REPAIR MOHS Right 11/18/2019    Procedure: FOREHEAD WOUND CLOSURE WITH FULL THICKNESS SKIN GRAFT (RIGHT GROIN);  Surgeon: MICHELINE Yuan MD;  Location:  OR       Social History:  Patient reports that she quit smoking about 8 months ago. She has never used smokeless tobacco.    Family History:  She denies extensive family history of Skin cancer  She denies known genetic mutations.   Her mother was adopted, so family history is incomplete.     Medications:  Current Outpatient Medications   Medication Sig Dispense Refill     doxycycline hyclate (VIBRAMYCIN) 100 MG capsule        ketoconazole (NIZORAL) 2 % external shampoo 1 APPLICATION DAILY TOPICALLY WASH IN SCALP DAILY       metroNIDAZOLE (METROCREAM) 0.75 % external cream        nicotine (NICORETTE) 4 MG lozenge Place 4 mg inside cheek as needed for smoking cessation       oxyCODONE (OXY-IR) 5 MG capsule Take 5 mg by mouth every 4 hours as needed for severe pain       sulfamethoxazole-trimethoprim (BACTRIM/SEPTRA) 8 mg/mL suspension Take 10 mg/kg/day by mouth 2 times daily       traZODone HCl 300 MG TABS        No Known Allergies      Review of Systems:  -Constitutional: Otherwise feeling well today, in usual state of health.  -HEENT: Patient denies nonhealing oral sores.  -Skin: As above in HPI. No additional skin concerns.    Physical exam:  Vitals: There were no vitals taken for this  visit.  GEN: This is a well developed, well-nourished female in no acute distress, in a pleasant mood.    SKIN: Total skin excluding the undergarment areas was performed. The exam included the head/face, neck, both arms, chest, back, abdomen, both legs, digits and/or nails.   -Funez skin type: II  -There is a 2mm pearly pink to skin colored papule on the galbella. Telangectasia present on dermoscopy.   -There is a 2mm pink to skin colored pearly papule on the right infraorbital cheek. Telangectasia present on dermoscopy.    -There is no erythema, telangectasias, nodularity, or pigmentation on previous nmsc surgery scars.  - scattered pink 3-4mm papules on the facial convexities with adjacent telangectasia   - Several patches of gritty scale scattered on bilateral cheeks   -No other lesions of concern on areas examined.     Impression/Plan:  1. Neoplasm of uncertain behavior of skin, glabella and right infraorbital cheek.   Lesions are subtle, but history of multiple skin cancers argues for lower screening threshold.     Rosacea vs Seb Hyperplasia vs BCC vs IDN    - Shave biopsy:  After discussion of benefits and risks including but not limited to bleeding/bruising, pain/swelling, infection, scar, incomplete removal, nerve damage/numbness, recurrence, and non-diagnostic biopsy, written consent, verbal consent and photographs were obtained. Time-out was performed. The area was cleaned with isopropyl alcohol. 0.5ml of 1% lidocaine with 1:100,000 epinephrine was injected to obtain adequate anesthesia. A shave biopsy was performed on each site, the glabella and right infraorbital cheek. Hemostasis was achieved with aluminium chloride. Vaseline and a sterile dressing were applied. The patient tolerated the procedure and no complications were noted. The patient was provided with verbal and written post care instructions.    **Update   FINAL DIAGNOSIS:   A. Skin, Glabella, shave:   - Mild epidermal hyperplasia and  "perifollicular lymphoplasmacytic   infiltrate - (see comment)     B. Skin, Right infraorbital cheek, shave:   - Macular seborrheic keratosis - (see description)     COMMENT:   A.  These features are not entirely diagnostic but appear inflammatory in   nature; they may represent   superficially-sampled acne rosacea.  While there is no evidence of   malignancy on level sections, little dermis   is seen.  Thus clinical follow-up is recommended.    2. Actinic keratosis  - precancerous nature reviewed.   - LN2 treatment is appropriate for lesions that are clinically apparent, but cannot \"get ahead\" of AK's.   - Will start field treatment likely with Efudex after biopsy results available (PDT appointments have been deferred for COVID social distancing).     -  Discussed risks and benefits of PDT including but not limited to discomfort and pain with procedure, time for procedure, need for avoidance of sun exposure after treatment, expected irritation after treatment, risk of exuberant reaction.   - Efudex and chemical peels were briefly discussed including risks and benefits.     3. Rosacea, papular  - Doxycycline and Metronidazole prescribed by prior dermatologist.   - Agree with this treatment for 3 months to reduce distraction during next skin exam.   - Rosacea may be flared by facial field therapy for AK's.      4. History of nonmelanoma skin cancer, no clincial evidence of recurrence  - Sun precaution was advised including the use of sun screens of SPF 30 or higher, sun protective clothing, and avoidance of tanning beds.  - She reports being satisfied with FTSG repair of her Mohs wound.    FTSG can allow for earlier recognition of BCC recurrence compared with local flap.    It would be reasonable to delay tissue expanders for a couple years to monitor for recurrence.   - Once biopsy results are known, will determined skin cancer screening schedule. Likely q3-4 months for now, but will be guided by incidence.  - Number " of skin cancers at her age is likely due to skin type an sun exposure in [place] years ago.   - No clinical indications for genetic screening      CC Dr. Daphne Robison and Dr. Allan Pearson on close of this encounter.  Follow-up pending biopsy results.           Staff Involved:  Staff Only    Scribe Disclosure:   I, Mandy huston CMA, am serving as a scribe to document services personally performed by Dr. Crow Cervantes, based on data collection and the provider's statements to me.     Provider Disclosure:   The documentation recorded by the scribe accurately reflects the services I personally performed and the decisions made by me.  I personally performed the procedures today.    Crow Cervantes DO    Department of Dermatology  [facility]: Phone: [phone], Fax:[phone]  [facility]: Phone: [phone], Fax: [phone]

## 2020-05-13 NOTE — PATIENT INSTRUCTIONS

## 2020-05-13 NOTE — NURSING NOTE
Jessica Nickerson's goals for this visit include:   Chief Complaint   Patient presents with     Derm Problem     skin check      She requests these members of her care team be copied on today's visit information: yes     PCP: No Ref-Primary, Physician    Referring Provider:  No referring provider defined for this encounter.    There were no vitals taken for this visit.    Do you need any medication refills at today's visit? No     Amorribrahima Alaniz CMA

## 2020-05-18 ENCOUNTER — TELEPHONE (OUTPATIENT)
Dept: DERMATOLOGY | Facility: CLINIC | Age: 50
End: 2020-05-18

## 2020-05-18 DIAGNOSIS — L57.0 AK (ACTINIC KERATOSIS): Primary | ICD-10-CM

## 2020-05-18 LAB — COPATH REPORT: NORMAL

## 2020-05-18 NOTE — TELEPHONE ENCOUNTER
Notes recorded by Amee Paige RN on 5/18/2020 at 3:59 PM CDT   Pt returned the call and notified of results and 's recommendations. Pt verbalized understanding and states that she does not have another appt scheduled and wants to know what the plan is moving forward if she is going to do the light treatments or what the plan is. Pt states she has had 16 surgeries and wants to be proactive to prevent having any more surgeries. Last office visit notes are still pending. Pt advised that RN would reach out to  to provide further input on this and pt will be contacted with further recommendations..Amee Paige RN     ------     Notes recorded by Rama Jordan LPN on 5/18/2020 at 3:38 PM CDT   Left message for patient to call [facility] in [place] back at [phone]     Rama Jordan LPN     ------     Notes recorded by Crow Cervantes MD on 5/18/2020 at 3:33 PM CDT   Please call the patient with pathology results.     Both biopsies were benign.   The spot on her glabella was likely a rosacea bump. Continue rosacea treatments and we will check that site again the next time she is in.   The spot on her right cheek was a benign SK. As long as the wound is healing well, no additional surgery is necessary.     Thank you.

## 2020-05-21 RX ORDER — FLUOROURACIL 50 MG/G
CREAM TOPICAL
Qty: 40 G | Refills: 0 | Status: SHIPPED | OUTPATIENT
Start: 2020-05-21

## 2020-05-21 NOTE — TELEPHONE ENCOUNTER
I spoke with the patient to discuss her treatment plan.     Hold metrocream. Continue doxycycline to attempt to limit rosacea flare by topical Efudex.     Start Efudex twice daily for 3 weeks. Apply to cheeks, nose, forehead. Call for severe reactions. Use Petroleum jelly for irritation. Avoid sun exposure.     Staff, please call her to arrange a follow up appointment in July. Also please review Efudex treatment guidelines with her. Thank you.

## 2020-05-26 NOTE — TELEPHONE ENCOUNTER
Left message for patient to call [facility] in [place] back at [phone]    Rama Jordan LPN

## 2020-06-01 NOTE — TELEPHONE ENCOUNTER
Pt called, No answer. . Left general message for pt to call the [facility] clinic back at [phone]....Amee Paige RN

## 2020-06-08 NOTE — TELEPHONE ENCOUNTER
Left message for patient to call [facility] in [place] back at [phone]    Rama Jordan LPN

## 2020-08-14 ENCOUNTER — TELEPHONE (OUTPATIENT)
Dept: DERMATOLOGY | Facility: CLINIC | Age: 50
End: 2020-08-14

## 2020-08-14 NOTE — TELEPHONE ENCOUNTER
[facility] Call Center    Phone Message    May a detailed message be left on voicemail: no     Reason for Call: Other: Pt insisted on making appt with dept with Dr Cervantes.  Said she rec'd a phone call (not Lacies message to call the dept back)     Action Taken:     Travel Screening:

## 2020-08-14 NOTE — TELEPHONE ENCOUNTER
8/14 Provided phone number [phone] to schedule in about 6 months (around 11/13/2020) for Skin check.     Pat Mukherjee   Procedure    Ortho/Sports Med/Pod/Ent/Eye/Surgical Specialties  [facility] [place]   [phone]

## 2020-08-17 ENCOUNTER — TELEPHONE (OUTPATIENT)
Dept: DERMATOLOGY | Facility: CLINIC | Age: 50
End: 2020-08-17

## 2020-08-17 NOTE — TELEPHONE ENCOUNTER
Received a staff message today from the [facility] that pt has been reached out to and vm left to discuss scheduling...Amee Paige RN

## 2020-08-17 NOTE — TELEPHONE ENCOUNTER
Pt called and states that she has had 17 skin cancer surgeries and the last time at her previous dermatology office her surgery date was pushed out and she was told usually reoccurrences are more aggressive so pt does not want to put this off. Pt states that she noticed two new spots in areas where she had mohs surgery or an excision in the past. The first spot is by her shoulder and she had mohs surgery there in the past and the second spot is at the back of her neck where she had an excision. RN offered a phone visit with photos on 9/10 with  but pt declined and states she really wants something sooner. RN offered to send a message to the [facility] to see if pt can be seen sooner at that location. Pt agrees with this plan. RN sent a staff message to the Derm [facility] team...Amee Paige RN

## 2020-08-17 NOTE — TELEPHONE ENCOUNTER
----- Message from Amee Paige RN sent at 8/17/2020  3:52 PM CDT -----  [facility] derm team      Pt calling wanting a sooner appt with  than the ones that were offered to her on 9/10 or 9/14 in  for two spot checks. Hx of BCC. Can someone please look into this and call pt and offer a sooner appt if available?      Thank you      Amee

## 2020-08-17 NOTE — TELEPHONE ENCOUNTER
Pt called, no answer. VM Id's pt. Left detailed message with reason for call and  for pt to call the [facility] clinic back at [phone].    No messages documented from clinic. Patient was called by our  Pat to make 6 month skin check in November 2020. Please assist in scheduling.    Ruth Horn LPN

## 2020-08-17 NOTE — TELEPHONE ENCOUNTER
[facility] Call Center    Phone Message    May a detailed message be left on voicemail: yes     Reason for Call: Other: Patient is calling to talk with a nurse and/or possible appointment . She has a history of basil cell carcinoma and has found two new spots near the area she had MOHS surgery in the past. The spots are near her neck and shoulder. I offered her an appointment but she felt it was too far out, She would like a call back to discuss before scheduling an appointment with Dr. Cervantes.     Action Taken: Message routed to:  Adult Clinics: Dermatology p [id number]    Travel Screening: Not Applicable

## 2020-08-20 NOTE — TELEPHONE ENCOUNTER
Called patient back to reschedule appointment from [place] to a earlier appointment with  for concerning spots. Patient was satisfied and told clinic regulations due to COVID.

## 2020-08-25 ENCOUNTER — OFFICE VISIT (OUTPATIENT)
Dept: DERMATOLOGY | Facility: CLINIC | Age: 50
End: 2020-08-25
Payer: COMMERCIAL

## 2020-08-25 DIAGNOSIS — D48.5 NEOPLASM OF UNCERTAIN BEHAVIOR OF SKIN OF EAR: Primary | ICD-10-CM

## 2020-08-25 DIAGNOSIS — D23.61: ICD-10-CM

## 2020-08-25 DIAGNOSIS — Z85.828 HISTORY OF NONMELANOMA SKIN CANCER: ICD-10-CM

## 2020-08-25 DIAGNOSIS — L82.1 SEBORRHEIC KERATOSIS: ICD-10-CM

## 2020-08-25 ASSESSMENT — PAIN SCALES - GENERAL: PAINLEVEL: NO PAIN (0)

## 2020-08-25 NOTE — PROGRESS NOTES
[facility] Dermatology Note      Dermatology Problem List:  1. BCC-    -Left frontal scalp s/p left frontal scalp- (per pt)    -left shoulder s/p excision-(per pt)    -Right anterior thigh- S/p excision- 2017    -left scapula s/p excision 2017    -Right chest s/p excision 2017   -left trapezius s/p excision 2017   -left frontal scalp- s/p mohs 2019   -right lower forehead s/p mohs 3/2020  2. Seborrheic keratosis    - Right infraorbital cheek s/p Bx 5/13/20  3. Actinic Keratosis   - prior LN2 Treatment   - Plan to start field treatment with Efudex, but may transition to PDT once COVID restrictions lifted.   4. Papular Rosacea   - Doxycycline and Metrocream   - Mild epidermal hyperplasia and perifollicular lymphoplasmacytic infiltrate (consistent with acne/rosacea)  5. Neoplasm of uncertain behavior   - Crux of the helix of the left ear, bx 8/25/2020       CC:   Chief Complaint   Patient presents with     Derm Problem     Jessica is here today for spots of concern on upper back, right shoulder.        Encounter Date: Aug 25, 2020    History of Present Illness:  Ms. Jessica Nickerson is a 49 year old female who presents for follow up with two lesions of concern, one at the pole of an old incision from the right shoulder (patient believes this was a BCC excision) and over an incision line of the upper back.     She has noted these two raised areas in the past 2 months. No particular symptoms, she is concerned that these may be recurrences.      She also reports that she has some sensation of pruritus within the ear and believes she has some scaly lesions here.     She had PDT with her previous dermatologist in early June.  She had levulan applied, then incubated for 1 hour and had 16 minutes of light treatment.  She had no reaction to this initial treatment . The PDT was repeated a week later with a longer incubation period (2 hours). She had a minimal reaction to this second treatment.     As a recap,  the patient grew up in [place] and had extensive sun exposure. She is frustrated with the number of skin cancers she has developed. Patient denies any history of genetic mutations, palmar pits, odontogenic cysts, or other congenital malformations.       Past Medical History:   There is no problem list on file for this patient.    No past medical history on file.  Past Surgical History:   Procedure Laterality Date     REPAIR MOHS Right 11/18/2019    Procedure: FOREHEAD WOUND CLOSURE WITH FULL THICKNESS SKIN GRAFT (RIGHT GROIN);  Surgeon: MICHELINE Yuan MD;  Location:  OR       Social History:  Patient reports that she quit smoking about a year ago. She has never used smokeless tobacco.    Family History:  She denies extensive family history of Skin cancer  She denies known genetic mutations.   Her mother was adopted, so family history is incomplete.     Medications:  Current Outpatient Medications   Medication Sig Dispense Refill     nicotine (NICORETTE) 4 MG lozenge Place 4 mg inside cheek as needed for smoking cessation       traZODone HCl 300 MG TABS        doxycycline hyclate (VIBRAMYCIN) 100 MG capsule        fluorouracil (EFUDEX) 5 % external cream Apply a thin layer to your cheeks, nose and forehead twice daily for 3 weeks. (Patient not taking: Reported on 8/25/2020) 40 g 0     ketoconazole (NIZORAL) 2 % external shampoo 1 APPLICATION DAILY TOPICALLY WASH IN SCALP DAILY       metroNIDAZOLE (METROCREAM) 0.75 % external cream        oxyCODONE (OXY-IR) 5 MG capsule Take 5 mg by mouth every 4 hours as needed for severe pain       sulfamethoxazole-trimethoprim (BACTRIM/SEPTRA) 8 mg/mL suspension Take 10 mg/kg/day by mouth 2 times daily       No Known Allergies      Review of Systems:  -Constitutional: Otherwise feeling well today, in usual state of health.  -HEENT: Patient denies nonhealing oral sores.  -Skin: As above in HPI. No additional skin concerns.    Physical exam:  Vitals: There were no vitals  taken for this visit.  GEN: This is a well developed, well-nourished female in no acute distress, in a pleasant mood.    SKIN: Focused skin exam of the face, ears, upper back and bilateral shoulders was performed today    -Funez skin type: II-III  - Right shoulder with well healed linear scar and adjacent brown papule with regular globular pigment pattern on dermoscopy at the posterior edge of the scar line  - Upper mid back with well healed scar and with skin colored, stuck on appearing papule directly to the left of the scar line.   - Bilateral crux of the helices (L>R) with pink, thin papules. On the left side there is a small erosion overlying this papule.   -No other lesions of concern on areas examined.     Impression/Plan:  1. Neoplasm of uncertain behavior of skin, crux of the helix of the left ear  - Favor CNH vs SCC    Shave biopsy:  After discussion of benefits and risks including but not limited to bleeding/bruising, pain/swelling, infection, scar, incomplete removal, nerve damage/numbness, recurrence, and non-diagnostic biopsy, written consent, verbal consent and photographs were obtained. Time-out was performed. The area was cleaned with isopropyl alcohol. 0.5mL of 1% lidocaine with epinephrine was injected to obtain adequate anesthesia of the lesion on the Crux of the helix of the left ear. A shave biopsy was performed. Hemostasis was achieved with aluminium chloride. Vaseline and a sterile dressing were applied. The patient tolerated the procedure and no complications were noted. The patient was provided with verbal and written post care instructions.     2. Favor intradermal nevus of the right shoulder   - Benign appearing under dermoscopy  - Discussed biopsy vs monitoring with patient, she is OK with monitoring for now.     3. Seborrheic keratosis of upper back  - Discussed the nature of the diagnosis/condition  - Reassurance.     4. History of non-melanoma skin cancer   - Discussed the  nature and appearance of non-melanoma and melanoma skin cancer  - Recommended sun protection.     Claude Orr MD  PGY-6    Micrographic Surgery and Dermatologic Oncology Fellow  August 25, 2020    Staff Physician Comments:   I saw and evaluated the patient with the Mohs Surgery Fellow (Dr. Claude Orr) and I agree with the assessment and plan and the above description of the procedure. I was present for the key portions of the procedure and entire exam.    Crow Cervantes DO    Department of Dermatology  [facility]: Phone: [phone], Fax:[phone]  [facility]: Phone: [phone], Fax: [phone]

## 2020-08-25 NOTE — NURSING NOTE
Chief Complaint   Patient presents with     Derm Problem     Jessica is here today for spots of concern on upper back, right shoulder.      Valencia Faulkner LPN

## 2020-08-25 NOTE — TELEPHONE ENCOUNTER
Upon appointment review [place] [facility] [place] Billy Jordan LPN

## 2020-08-25 NOTE — LETTER
8/25/2020       RE: Jessica Nickerson  [address] [place] [address]     Dear Colleague,    Thank you for referring your patient, Jessica Nickerson, to the [facility] D[facility]MATOLOG[facility]ERY [facility]. Please see a copy of my visit note below.    [facility] Dermatology Note      Dermatology Problem List:  1. BCC-    -Left frontal scalp s/p left frontal scalp- (per pt)    -left shoulder s/p excision-(per pt)    -Right anterior thigh- S/p excision- 2017    -left scapula s/p excision 2017    -Right chest s/p excision 2017   -left trapezius s/p excision 2017   -left frontal scalp- s/p mohs 2019   -right lower forehead s/p mohs 3/2020  2. Seborrheic keratosis    - Right infraorbital cheek s/p Bx 5/13/20  3. Actinic Keratosis   - prior LN2 Treatment   - Plan to start field treatment with Efudex, but may transition to PDT once COVID restrictions lifted.   4. Papular Rosacea   - Doxycycline and Metrocream   - Mild epidermal hyperplasia and perifollicular lymphoplasmacytic infiltrate (consistent with acne/rosacea)  5. Neoplasm of uncertain behavior   - Crux of the helix of the left ear, bx 8/25/2020       CC:   Chief Complaint   Patient presents with     Derm Problem     Jessica is here today for spots of concern on upper back, right shoulder.        Encounter Date: Aug 25, 2020    History of Present Illness:  Ms. Jessica Nickerson is a 49 year old female who presents for follow up with two lesions of concern, one at the pole of an old incision from the right shoulder (patient believes this was a BCC excision) and over an incision line of the upper back.     She has noted these two raised areas in the past 2 months. No particular symptoms, she is concerned that these may be recurrences.      She also reports that she has some sensation of pruritus within the ear and believes she has some scaly lesions here.     She had PDT with her previous dermatologist in early  June.  She had levulan applied, then incubated for 1 hour and had 16 minutes of light treatment.  She had no reaction to this initial treatment . The PDT was repeated a week later with a longer incubation period (2 hours). She had a minimal reaction to this second treatment.     As a recap, the patient grew up in [place] and had extensive sun exposure. She is frustrated with the number of skin cancers she has developed. Patient denies any history of genetic mutations, palmar pits, odontogenic cysts, or other congenital malformations.       Past Medical History:   There is no problem list on file for this patient.    No past medical history on file.  Past Surgical History:   Procedure Laterality Date     REPAIR MOHS Right 11/18/2019    Procedure: FOREHEAD WOUND CLOSURE WITH FULL THICKNESS SKIN GRAFT (RIGHT GROIN);  Surgeon: MICHELINE Yuan MD;  Location:  OR       Social History:  Patient reports that she quit smoking about a year ago. She has never used smokeless tobacco.    Family History:  She denies extensive family history of Skin cancer  She denies known genetic mutations.   Her mother was adopted, so family history is incomplete.     Medications:  Current Outpatient Medications   Medication Sig Dispense Refill     nicotine (NICORETTE) 4 MG lozenge Place 4 mg inside cheek as needed for smoking cessation       traZODone HCl 300 MG TABS        doxycycline hyclate (VIBRAMYCIN) 100 MG capsule        fluorouracil (EFUDEX) 5 % external cream Apply a thin layer to your cheeks, nose and forehead twice daily for 3 weeks. (Patient not taking: Reported on 8/25/2020) 40 g 0     ketoconazole (NIZORAL) 2 % external shampoo 1 APPLICATION DAILY TOPICALLY WASH IN SCALP DAILY       metroNIDAZOLE (METROCREAM) 0.75 % external cream        oxyCODONE (OXY-IR) 5 MG capsule Take 5 mg by mouth every 4 hours as needed for severe pain       sulfamethoxazole-trimethoprim (BACTRIM/SEPTRA) 8 mg/mL suspension Take 10 mg/kg/day by  mouth 2 times daily       No Known Allergies      Review of Systems:  -Constitutional: Otherwise feeling well today, in usual state of health.  -HEENT: Patient denies nonhealing oral sores.  -Skin: As above in HPI. No additional skin concerns.    Physical exam:  Vitals: There were no vitals taken for this visit.  GEN: This is a well developed, well-nourished female in no acute distress, in a pleasant mood.    SKIN: Focused skin exam of the face, ears, upper back and bilateral shoulders was performed today    -Funez skin type: II-III  - Right shoulder with well healed linear scar and adjacent brown papule with regular globular pigment pattern on dermoscopy at the posterior edge of the scar line  - Upper mid back with well healed scar and with skin colored, stuck on appearing papule directly to the left of the scar line.   - Bilateral crux of the helices (L>R) with pink, thin papules. On the left side there is a small erosion overlying this papule.   -No other lesions of concern on areas examined.     Impression/Plan:  1. Neoplasm of uncertain behavior of skin, crux of the helix of the left ear  - Favor CNH vs SCC    Shave biopsy:  After discussion of benefits and risks including but not limited to bleeding/bruising, pain/swelling, infection, scar, incomplete removal, nerve damage/numbness, recurrence, and non-diagnostic biopsy, written consent, verbal consent and photographs were obtained. Time-out was performed. The area was cleaned with isopropyl alcohol. 0.5mL of 1% lidocaine with epinephrine was injected to obtain adequate anesthesia of the lesion on the Crux of the helix of the left ear. A shave biopsy was performed. Hemostasis was achieved with aluminium chloride. Vaseline and a sterile dressing were applied. The patient tolerated the procedure and no complications were noted. The patient was provided with verbal and written post care instructions.     2. Favor intradermal nevus of the right shoulder   -  Benign appearing under dermoscopy  - Discussed biopsy vs monitoring with patient, she is OK with monitoring for now.     3. Seborrheic keratosis of upper back  - Discussed the nature of the diagnosis/condition  - Reassurance.     4. History of non-melanoma skin cancer   - Discussed the nature and appearance of non-melanoma and melanoma skin cancer  - Recommended sun protection.     Claude Orr MD  PGY-6    Micrographic Surgery and Dermatologic Oncology Fellow  August 25, 2020    Staff Physician Comments:   I saw and evaluated the patient with the Mohs Surgery Fellow (Dr. Claude Orr) and I agree with the assessment and plan and the above description of the procedure. I was present for the key portions of the procedure and entire exam.    Crow Cervantes DO    Department of Dermatology  [facility]: Phone: [phone], Fax:[phone]  [facility]: Phone: [phone], Fax: [phone]

## 2020-08-25 NOTE — PATIENT INSTRUCTIONS

## 2020-08-27 LAB — COPATH REPORT: NORMAL

## 2020-08-28 ENCOUNTER — TELEPHONE (OUTPATIENT)
Dept: DERMATOLOGY | Facility: CLINIC | Age: 50
End: 2020-08-28

## 2020-08-28 DIAGNOSIS — H61.002 CHONDRODERMATITIS NODULARIS HELICIS OF LEFT EAR: Primary | ICD-10-CM

## 2020-08-28 NOTE — TELEPHONE ENCOUNTER
- Called and spoke with the patient regarding her recent biopsy - c/w chondrodermatitis nodularis helicis.   - Recommended using her ear buds less or changing the style of ear bud to prevent recurrence.   - No further treatment is needed.   - She is healing well.   - All questions and concerns addressed.     Claude Orr MD

## 2020-11-09 ENCOUNTER — OFFICE VISIT (OUTPATIENT)
Dept: DERMATOLOGY | Facility: CLINIC | Age: 50
End: 2020-11-09
Payer: COMMERCIAL

## 2020-11-09 DIAGNOSIS — D48.5 NEOPLASM OF UNCERTAIN BEHAVIOR OF SKIN: Primary | ICD-10-CM

## 2020-11-09 DIAGNOSIS — Z85.828 HISTORY OF BASAL CELL CARCINOMA OF SKIN: ICD-10-CM

## 2020-11-09 DIAGNOSIS — L70.0 ACNE VULGARIS: ICD-10-CM

## 2020-11-09 PROCEDURE — 88305 TISSUE EXAM BY PATHOLOGIST: CPT | Performed by: PATHOLOGY

## 2020-11-09 PROCEDURE — 99213 OFFICE O/P EST LOW 20 MIN: CPT | Mod: 25 | Performed by: DERMATOLOGY

## 2020-11-09 PROCEDURE — 11102 TANGNTL BX SKIN SINGLE LES: CPT | Performed by: DERMATOLOGY

## 2020-11-09 PROCEDURE — 88312 SPECIAL STAINS GROUP 1: CPT | Mod: GC | Performed by: PATHOLOGY

## 2020-11-09 RX ORDER — SPIRONOLACTONE 50 MG/1
TABLET, FILM COATED ORAL
Qty: 60 TABLET | Refills: 3 | Status: SHIPPED | OUTPATIENT
Start: 2020-11-09 | End: 2021-06-28

## 2020-11-09 ASSESSMENT — PAIN SCALES - GENERAL: PAINLEVEL: NO PAIN (0)

## 2020-11-09 NOTE — LETTER
11/9/2020         RE: Jessica Nickerson  [address] [place] [address]        Dear Colleague,    Thank you for referring your patient, Jessica Nickerson, to the [facility]. Please see a copy of my visit note below.    [facility] Dermatology Note    Dermatology Problem List:  1. BCC-    -Left frontal scalp s/p left frontal scalp- (per pt)    -left shoulder s/p excision-(per pt)    -Right anterior thigh- S/p excision- 2017    -left scapula s/p excision 2017    -Right chest s/p excision 2017   -left trapezius s/p excision 2017   -left frontal scalp- s/p mohs 2019   -right lower forehead s/p mohs 3/2020  2. Seborrheic keratosis    - Right infraorbital cheek s/p Bx 5/13/20  3. Actinic Keratosis   - prior LN2 Treatment   - Plan to start field treatment with Efudex, but may transition to PDT once COVID restrictions lifted.   4. Papular Rosacea   - Doxycycline and Metrocream   - Mild epidermal hyperplasia and perifollicular lymphoplasmacytic infiltrate (consistent with acne/rosacea)  5. 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   6. Hx DN   -L spine, moderate s/p excision 12/19/2017  7. Per patient precursor to melanoma on hip   -patient estimates 2014  8.NUB left temple sp biopsy 11/9/2020     CC:   Chief Complaint   Patient presents with     Skin Check     area of concern right buttocks hx NMSC, DN and precursor to melanoma per patient     Acne     currently using cetaphil wash at home no other products       Encounter Date: Nov 9, 2020    History of Present Illness:  Ms. Jessica Nickerson is a 49 year old female who presents for FBSE and acne. Patient reports area of concern on right buttocks previously noted but unsure if area is present today. She recalls feeling a bump, but cannot feel it anymore.     Patient reports improvement to ears post biopsy last visit.     She is noticing more pimples on her lower face, but also  temples and forehead. She is using bland skin care. I the past she was prescribed a topical that helped.     As a recap, the patient grew up in [place] and had extensive sun exposure. She is frustrated with the number of skin cancers she has developed. Patient denies any history of genetic mutations, palmar pits, odontogenic cysts, or other congenital malformations.       Past Medical History:   There is no problem list on file for this patient.    No past medical history on file.  Past Surgical History:   Procedure Laterality Date     REPAIR MOHS Right 11/18/2019    Procedure: FOREHEAD WOUND CLOSURE WITH FULL THICKNESS SKIN GRAFT (RIGHT GROIN);  Surgeon: MICHELINE Yuan MD;  Location:  OR       Social History:  Patient reports she is a current every day smoker.     Family History:  She denies extensive family history of Skin cancer  She denies known genetic mutations.   Her mother was adopted, so family history is incomplete.     Medications:  Current Outpatient Medications   Medication Sig Dispense Refill     metroNIDAZOLE (METROCREAM) 0.75 % external cream        nicotine (NICORETTE) 4 MG lozenge Place 4 mg inside cheek as needed for smoking cessation       traZODone HCl 300 MG TABS        doxycycline hyclate (VIBRAMYCIN) 100 MG capsule        fluorouracil (EFUDEX) 5 % external cream Apply a thin layer to your cheeks, nose and forehead twice daily for 3 weeks. (Patient not taking: Reported on 8/25/2020) 40 g 0     ketoconazole (NIZORAL) 2 % external shampoo 1 APPLICATION DAILY TOPICALLY WASH IN SCALP DAILY       oxyCODONE (OXY-IR) 5 MG capsule Take 5 mg by mouth every 4 hours as needed for severe pain       sulfamethoxazole-trimethoprim (BACTRIM/SEPTRA) 8 mg/mL suspension Take 10 mg/kg/day by mouth 2 times daily       No Known Allergies      Review of Systems:  -Constitutional: Otherwise feeling well today, in usual state of health.  -HEENT: Patient denies nonhealing oral sores.  -Skin: As above in HPI.  No additional skin concerns.    Physical exam:  Vitals: There were no vitals taken for this visit.  GEN: This is a well developed, well-nourished female in no acute distress, in a pleasant mood.    SKIN: Total skin excluding the undergarment areas was performed. The exam included the head/face, neck, both arms, chest, back, abdomen, both legs, digits and/or nails.     -Funez skin type: II-III  - Right shoulder with well healed linear scar and adjacent brown papule with regular globular pigment pattern on dermoscopy at the posterior edge of the scar line  - Upper mid back with well healed scar and with skin colored, stuck on appearing papule directly to the left of the scar line.  -There is no erythema, telangectasias, nodularity, or pigmentation on the areas of prior skin cancers.  -There are superifical acneiform papules with intermixed open and closed comedones on the neck, chin, and temples.   - 4mm pink pearly papule with telangectasia periphery left temple  - sparse skin colored papules on lateral fingers, no vesicles present  -No other lesions of concern on areas examined.     Impression/Plan:  1. Bumps on fingers, vague appearance may be flat warts or verrucous keratosis.   Will monitor for changes.     2. Favor intradermal nevus of the right shoulder   - Benign appearing under dermoscopy, no change at todays visit.  - will continue to clinically monitor    3. Seborrheic keratosis of upper back  - Discussed the nature of the diagnosis/condition  - Reassurance.     4. History of non-melanoma skin cancer, DN and precursor to melanoma per patient  - Discussed the nature and appearance of non-melanoma and melanoma skin cancer  - Recommended sun protection and sun avoidance.     5. Hormonal vs inflammatory Acne   - neck and lower face affected, but temples, trunk and extremities too.   - patient previously used Spironolactone with improvement and OTC Differin without much improvement  - Patient would like to  try Spironolactone again. Informed that risks could include menstrual spotting, lowering BP and breast tenderness        Start spironolactone 50mg daily, if no side effects after 2 weeks, increase to 100mg daily.     6. Shave biopsy:  Left temple 4mm pink pearly papule with telangectasia periphery   Concern for early BCC.  -After discussion of benefits and risks including but not limited to bleeding/bruising, pain/swelling, infection, scar, incomplete removal, nerve damage/numbness, recurrence, and non-diagnostic biopsy, written consent, verbal consent and photographs were obtained. Time-out was performed. The area was cleaned with isopropyl alcohol. 0.5mL of 1% lidocaine with epinephrine was injected to obtain adequate anesthesia of the lesion. A shave biopsy was performed. Hemostasis was achieved with aluminium chloride. Vaseline and a sterile dressing were applied. The patient tolerated the procedure and no complications were noted. The patient was provided with verbal and written post care instructions.     Scribe Disclosure:   I, Rama Jordan, am serving as a scribe to document services personally performed by Dr. Cervantes, based on data collection and the provider's statements to me.     Provider Disclosure:   The documentation recorded by the scribe accurately reflects the services I personally performed and the decisions made by me.  I personally performed the procedures today.    Crow Cervantes DO    Department of Dermatology  [facility]: Phone: [phone], Fax:[phone]  [facility]: Phone: [phone], Fax: [phone]      Again, thank you for allowing me to participate in the care of your patient.        Sincerely,        Crow Cervantes MD

## 2020-11-09 NOTE — NURSING NOTE
Jessica Nickerson's goals for this visit include:   Chief Complaint   Patient presents with     Skin Check     area of concern right buttocks hx NMSC, DN and precursor to melanoma per patient     Acne     currently using cetaphil wash at home no other products       She requests these members of her care team be copied on today's visit information:     PCP: No Ref-Primary, Physician    Referring Provider:  Referred Self, MD  No address on file    There were no vitals taken for this visit.    Do you need any medication refills at today's visit? Africa Jordan LPN

## 2020-11-09 NOTE — PATIENT INSTRUCTIONS

## 2020-11-09 NOTE — NURSING NOTE
The following medication was given:     MEDICATION:  Lidocaine with epinephrine 1% [id number]  ROUTE: SQ  SITE: see procedure note  DOSE: 1cc  LOT #: -[id number]  : Trini  EXPIRATION DATE: 1-2-2021  NDC#: 0240-4630-00   Was there drug waste? 1cc  Multi-dose vial: Yes    Rama Jordan LPN  November 9, 2020

## 2020-11-09 NOTE — PROGRESS NOTES
[facility] Dermatology Note    Dermatology Problem List:  1. BCC-    -Left frontal scalp s/p left frontal scalp- (per pt)    -left shoulder s/p excision-(per pt)    -Right anterior thigh- S/p excision- 2017    -left scapula s/p excision 2017    -Right chest s/p excision 2017   -left trapezius s/p excision 2017   -left frontal scalp- s/p mohs 2019   -right lower forehead s/p mohs 3/2020  2. Seborrheic keratosis    - Right infraorbital cheek s/p Bx 5/13/20  3. Actinic Keratosis   - prior LN2 Treatment   - Plan to start field treatment with Efudex, but may transition to PDT once COVID restrictions lifted.   4. Papular Rosacea   - Doxycycline and Metrocream   - Mild epidermal hyperplasia and perifollicular lymphoplasmacytic infiltrate (consistent with acne/rosacea)  5. 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   6. Hx DN   -L spine, moderate s/p excision 12/19/2017  7. Per patient precursor to melanoma on hip   -patient estimates 2014  8.NUB left temple sp biopsy 11/9/2020     CC:   Chief Complaint   Patient presents with     Skin Check     area of concern right buttocks hx NMSC, DN and precursor to melanoma per patient     Acne     currently using cetaphil wash at home no other products       Encounter Date: Nov 9, 2020    History of Present Illness:  Ms. Jessica Nickerson is a 49 year old female who presents for FBSE and acne. Patient reports area of concern on right buttocks previously noted but unsure if area is present today. She recalls feeling a bump, but cannot feel it anymore.     Patient reports improvement to ears post biopsy last visit.     She is noticing more pimples on her lower face, but also temples and forehead. She is using bland skin care. I the past she was prescribed a topical that helped.     As a recap, the patient grew up in [place] and had extensive sun exposure. She is frustrated with the number of skin cancers she has developed. Patient  denies any history of genetic mutations, palmar pits, odontogenic cysts, or other congenital malformations.       Past Medical History:   There is no problem list on file for this patient.    No past medical history on file.  Past Surgical History:   Procedure Laterality Date     REPAIR MOHS Right 11/18/2019    Procedure: FOREHEAD WOUND CLOSURE WITH FULL THICKNESS SKIN GRAFT (RIGHT GROIN);  Surgeon: MICHELINE Yuan MD;  Location:  OR       Social History:  Patient reports she is a current every day smoker.     Family History:  She denies extensive family history of Skin cancer  She denies known genetic mutations.   Her mother was adopted, so family history is incomplete.     Medications:  Current Outpatient Medications   Medication Sig Dispense Refill     metroNIDAZOLE (METROCREAM) 0.75 % external cream        nicotine (NICORETTE) 4 MG lozenge Place 4 mg inside cheek as needed for smoking cessation       traZODone HCl 300 MG TABS        doxycycline hyclate (VIBRAMYCIN) 100 MG capsule        fluorouracil (EFUDEX) 5 % external cream Apply a thin layer to your cheeks, nose and forehead twice daily for 3 weeks. (Patient not taking: Reported on 8/25/2020) 40 g 0     ketoconazole (NIZORAL) 2 % external shampoo 1 APPLICATION DAILY TOPICALLY WASH IN SCALP DAILY       oxyCODONE (OXY-IR) 5 MG capsule Take 5 mg by mouth every 4 hours as needed for severe pain       sulfamethoxazole-trimethoprim (BACTRIM/SEPTRA) 8 mg/mL suspension Take 10 mg/kg/day by mouth 2 times daily       No Known Allergies      Review of Systems:  -Constitutional: Otherwise feeling well today, in usual state of health.  -HEENT: Patient denies nonhealing oral sores.  -Skin: As above in HPI. No additional skin concerns.    Physical exam:  Vitals: There were no vitals taken for this visit.  GEN: This is a well developed, well-nourished female in no acute distress, in a pleasant mood.    SKIN: Total skin excluding the undergarment areas was  performed. The exam included the head/face, neck, both arms, chest, back, abdomen, both legs, digits and/or nails.     -Funez skin type: II-III  - Right shoulder with well healed linear scar and adjacent brown papule with regular globular pigment pattern on dermoscopy at the posterior edge of the scar line  - Upper mid back with well healed scar and with skin colored, stuck on appearing papule directly to the left of the scar line.  -There is no erythema, telangectasias, nodularity, or pigmentation on the areas of prior skin cancers.  -There are superifical acneiform papules with intermixed open and closed comedones on the neck, chin, and temples.   - 4mm pink pearly papule with telangectasia periphery left temple  - sparse skin colored papules on lateral fingers, no vesicles present  -No other lesions of concern on areas examined.     Impression/Plan:  1. Bumps on fingers, vague appearance may be flat warts or verrucous keratosis.   Will monitor for changes.     2. Favor intradermal nevus of the right shoulder   - Benign appearing under dermoscopy, no change at todays visit.  - will continue to clinically monitor    3. Seborrheic keratosis of upper back  - Discussed the nature of the diagnosis/condition  - Reassurance.     4. History of non-melanoma skin cancer, DN and precursor to melanoma per patient  - Discussed the nature and appearance of non-melanoma and melanoma skin cancer  - Recommended sun protection and sun avoidance.     5. Hormonal vs inflammatory Acne   - neck and lower face affected, but temples, trunk and extremities too.   - patient previously used Spironolactone with improvement and OTC Differin without much improvement  - Patient would like to try Spironolactone again. Informed that risks could include menstrual spotting, lowering BP and breast tenderness        Start spironolactone 50mg daily, if no side effects after 2 weeks, increase to 100mg daily.     6. Shave biopsy:  Left temple 4mm  pink pearly papule with telangectasia periphery   Concern for early BCC.  -After discussion of benefits and risks including but not limited to bleeding/bruising, pain/swelling, infection, scar, incomplete removal, nerve damage/numbness, recurrence, and non-diagnostic biopsy, written consent, verbal consent and photographs were obtained. Time-out was performed. The area was cleaned with isopropyl alcohol. 0.5mL of 1% lidocaine with epinephrine was injected to obtain adequate anesthesia of the lesion. A shave biopsy was performed. Hemostasis was achieved with aluminium chloride. Vaseline and a sterile dressing were applied. The patient tolerated the procedure and no complications were noted. The patient was provided with verbal and written post care instructions.     Scribe Disclosure:   I, Rama Jordan, am serving as a scribe to document services personally performed by Dr. Cervantes, based on data collection and the provider's statements to me.     Provider Disclosure:   The documentation recorded by the scribe accurately reflects the services I personally performed and the decisions made by me.  I personally performed the procedures today.    Crow Cervantes DO    Department of Dermatology  [facility]: Phone: [phone], Fax:[phone]  [facility]: Phone: [phone], Fax: [phone]

## 2020-11-16 LAB — COPATH REPORT: NORMAL

## 2020-11-17 ENCOUNTER — TELEPHONE (OUTPATIENT)
Dept: DERMATOLOGY | Facility: CLINIC | Age: 50
End: 2020-11-17

## 2020-11-17 NOTE — TELEPHONE ENCOUNTER
Result Notes     Valencia Faulkner LPN   11/17/2020  3:01 PM      I spoke to Jessica Nickerson and gave results. Patient understood and had no further questions or concerns.     KARISHMA Hernández Stephanie, LPN   11/17/2020  8:53 AM      Left voicemail for patient to discuss pathology results. Provided direct clinic phone number to return call.        KARISHMA Woods Adam R, MD   11/16/2020  5:26 PM      Please call the patient with pathology results.     The biopsy showed inflammation around a hair follicle, most consistent with a resolving rosacea bump. No skin cancer was seen.   As long as the wound is healing well, no additional surgery is necessary.  If she would prefer to push her next appointment out 6 months instead of 3, I feel comfortable with that change. If she would prefer to keep the 3 month appointment that is ok too.   Thank you.

## 2021-02-08 ENCOUNTER — TELEPHONE (OUTPATIENT)
Dept: DERMATOLOGY | Facility: CLINIC | Age: 51
End: 2021-02-08

## 2021-02-08 NOTE — TELEPHONE ENCOUNTER
[facility] Call Center    Phone Message    May a detailed message be left on voicemail: yes     Reason for Call: The patient called stating she has health insurance until the end of February for sure and is asking if she can be seen this month instead of in May? Please advise. Thank you    Action Taken: Message routed to:  Adult Clinics: Dermatology p [phone]    Travel Screening: Not Applicable

## 2021-06-28 ENCOUNTER — OFFICE VISIT (OUTPATIENT)
Dept: DERMATOLOGY | Facility: CLINIC | Age: 51
End: 2021-06-28
Payer: COMMERCIAL

## 2021-06-28 DIAGNOSIS — L81.4 SOLAR LENTIGO: ICD-10-CM

## 2021-06-28 DIAGNOSIS — D18.01 CHERRY ANGIOMA: ICD-10-CM

## 2021-06-28 DIAGNOSIS — Z85.828 HISTORY OF NONMELANOMA SKIN CANCER: Primary | ICD-10-CM

## 2021-06-28 DIAGNOSIS — L70.0 ACNE VULGARIS: ICD-10-CM

## 2021-06-28 DIAGNOSIS — D22.9 MULTIPLE BENIGN NEVI: ICD-10-CM

## 2021-06-28 DIAGNOSIS — L71.9 ROSACEA: ICD-10-CM

## 2021-06-28 DIAGNOSIS — L57.0 ACTINIC KERATOSES: ICD-10-CM

## 2021-06-28 DIAGNOSIS — Z86.018 HISTORY OF DYSPLASTIC NEVUS: ICD-10-CM

## 2021-06-28 DIAGNOSIS — D23.9 DERMATOFIBROMA: ICD-10-CM

## 2021-06-28 DIAGNOSIS — L82.1 SEBORRHEIC KERATOSES: ICD-10-CM

## 2021-06-28 PROCEDURE — 99213 OFFICE O/P EST LOW 20 MIN: CPT | Mod: 25 | Performed by: DERMATOLOGY

## 2021-06-28 PROCEDURE — 17000 DESTRUCT PREMALG LESION: CPT | Performed by: DERMATOLOGY

## 2021-06-28 RX ORDER — SPIRONOLACTONE 50 MG/1
TABLET, FILM COATED ORAL
Qty: 60 TABLET | Refills: 3 | Status: SHIPPED | OUTPATIENT
Start: 2021-06-28 | End: 2021-11-12

## 2021-06-28 ASSESSMENT — PAIN SCALES - GENERAL: PAINLEVEL: NO PAIN (0)

## 2021-06-28 NOTE — PATIENT INSTRUCTIONS
Cryotherapy    What is it?    Use of a very cold liquid, such as liquid nitrogen, to freeze and destroy abnormal skin cells that need to be removed    What should I expect?    Tenderness and redness    A small blister that might grow and fill with dark purple blood. There may be crusting.    More than one treatment may be needed if the lesions do not go away.    How do I care for the treated area?    Gently wash the area with your hands when bathing.    Use a thin layer of Vaseline to help with healing. You may use a Band-Aid.     The area should heal within 7-10 days and may leave behind a pink or lighter color.     Do not use an antibiotic or Neosporin ointment.     You may take acetaminophen (Tylenol) for pain.     Call your Doctor if you have:    Severe pain    Signs of infection (warmth, redness, cloudy yellow drainage, and or a bad smell)    Questions or concerns    Who should I call with questions?       [facility]: [phone]       [facility]: [phone]       For urgent needs outside of business hours call the [facility] at [phone]        and ask for the dermatology resident on call

## 2021-06-28 NOTE — PROGRESS NOTES
[facility] Dermatology Note  Encounter Date: Jun 28, 2021  Office Visit     Dermatology Problem List:  1. BCC              -Left frontal scalp s/p left frontal scalp- (per pt)               -left shoulder s/p excision-(per pt)               -Right anterior thigh- S/p excision- 2017               -left scapula s/p excision 2017                         -Right chest s/p excision 2017              -left trapezius s/p excision 2017              -left frontal scalp- s/p mohs 2019              -right lower forehead s/p mohs 3/2020  2. Seborrheic keratosis               - Right infraorbital cheek s/p Bx 5/13/20  3. Actinic Keratosis              - prior LN2 Treatment  4. Papular Rosacea              - Metrocream              - Mild epidermal hyperplasia and perifollicular lymphoplasmacytic infiltrate (consistent with acne/rosacea)  5. CNH - Crux of the left helix, bx 8/25/2020              Improved with change in headphones.   6. Hx DN              -L spine, moderate s/p excision 12/19/2017  7. Per patient precursor to melanoma on hip              -patient estimates 2014    ____________________________________________    Assessment & Plan:    # History of non-melanoma skin cancer, DN and precursor to melanoma per patient  - Discussed the nature and appearance of non-melanoma and melanoma skin cancer  - Recommended sun protection and sun avoidance.    # Right lower eyelid - skin colored papule c/w nevus. Patient interested in removal. Discussed risks of scarring in this area. Patient will consider shave biopsy, and if interested, will remove at next skin exam.     # 5 mm pink macule with telangiectasia c/w actinic keratosis on the right mid cheek. Will treat with cryo today and confirm resolution at next skin check.   - See cryo procedure note.   - Photo taken today.    # Papular Rosacea - will refill Metrocream today. Discussed that we could consider laser treatments in the future. Patient made aware  that insurance is unlikely to cover this procedure. Discussed risks of laser treatment with patient at this time.   - Apply Metrocream daily. Refilled.   - Follow up in 3 months to confirm improvement.    # Hormonal vs inflammatory Acne   - Will restart spironolactone 50 mg daily. If no side effects after 2 weeks, increase to 100 mg daily. Refilled today.  - Follow up in 3 months to confirm improvement.    # Benign findings, including: seborrheic keratosis, dermatofibroma, cherry angioma   - No further intervention needed.    # Multiple clinically benign nevi.   - No further intervention needed.      # Solar lentigines.   - No further intervention needed.       Procedures Performed:   - Cryotherapy procedure note, location(s): right mid cheek. After verbal consent and discussion of risks and benefits including, but not limited to, dyspigmentation/scar, blister, and pain, 1 lesion(s) was(were) treated with 1-2 mm freeze border for 1-2 cycles with liquid nitrogen. Post cryotherapy instructions were provided.    Follow-up: 3 month(s) virtually (telephone with photos), or earlier for new or changing lesions    Staff and Scribe:     Scribe Disclosure:   I, Adalberto Rinaldi, am serving as a scribe to document services personally performed by this physician, Dr. Crow Cervantes, based on data collection and the provider's statements to me.     Provider Disclosure:   The documentation recorded by the scribe accurately reflects the services I personally performed and the decisions made by me.  I personally performed the procedures today.    Crow Cervantes DO    Department of Dermatology  [facility]: Phone: [phone], Fax:[phone]  [facility]: Phone: [phone], Fax: [phone]    ____________________________________________    CC: Skin Check (no areas of concern hx NMSC, DN and precursor to  "melanoma), Rosacea (metrocream), Acne (spirnolactone request refill patient states active break outs during ovulation), and Derm Problem (would like to discuss pore size reduction and black heads)    HPI:  Ms. Jessica Nickerson is a(n) 50 year old female who presents today as a return patient for a skin check.    Last seen 11/9/2020 for a skin check. At that time, a biopsy was taken from the left temple, came back as an inflamed keratosis. She was also restarted on spironolactone 50 mg daily for acne, with the plan to increase to 100mg daily if no side effects. Patient also had bumps on the fingers (ddx flat warts vs verrucous keratosis) to monitor for changes. Lesion on the right shoulder noted to monitor, c/w intradermal nevus.     Today, she is requesting refills of Metrocream and spironolactone for her rosacea and acne. She has not taken spironolactone for at least 6 months, but notes when she had it helped with her acne. She is now having \"boil-like acne\" which has been occurring in the last 6 months. She has noticed some new moles, but has no spots that she is concerned about.     Patient is otherwise feeling well, without additional skin concerns.    Labs Reviewed:  N/A    Physical Exam:  Vitals: There were no vitals taken for this visit.  SKIN: Total skin excluding the undergarment areas was performed. The exam included the head/face, neck, both arms, chest, back, abdomen, both legs, digits and/or nails.   -  5 mm pink macule with telangiectasia on the right mid cheek  - There are dome shaped bright red papules on the trunk and extremities.   - There is a well healed surgical scar without erythema, nodularity or telangiectasias at the sites of prior NMSC and DN.  - There is a firm tan/flesh colored papule that dimples with lateral pressure on the left thigh.  - There are scattered telangectasias on the bilateral cheeks.   - There are waxy stuck on tan to brown papules on the trunk and extremities.  - " Multiple regular brown pigmented macules and papules are identified on the trunk and extremities.    - Scattered brown macules on sun exposed areas.  - Right lower eyelid - skin colored papule  - No other lesions of concern on areas examined.     Medications:  Current Outpatient Medications   Medication     metroNIDAZOLE (METROCREAM) 0.75 % external cream     nicotine (NICORETTE) 4 MG lozenge     spironolactone (ALDACTONE) 50 MG tablet     traZODone HCl 300 MG TABS     doxycycline hyclate (VIBRAMYCIN) 100 MG capsule     fluorouracil (EFUDEX) 5 % external cream     ketoconazole (NIZORAL) 2 % external shampoo     oxyCODONE (OXY-IR) 5 MG capsule     sulfamethoxazole-trimethoprim (BACTRIM/SEPTRA) 8 mg/mL suspension     No current facility-administered medications for this visit.      Past Medical History:   Rosacea  Tobacco abuse

## 2021-06-28 NOTE — LETTER
6/28/2021         RE: Jessica Nickerson  [address] [place] [id number]        Dear Colleague,    Thank you for referring your patient, Jessica Nickerson, to the [facility]. Please see a copy of my visit note below.    [facility] Dermatology Note  Encounter Date: Jun 28, 2021  Office Visit     Dermatology Problem List:  1. BCC              -Left frontal scalp s/p left frontal scalp- (per pt)               -left shoulder s/p excision-(per pt)               -Right anterior thigh- S/p excision- 2017               -left scapula s/p excision 2017                         -Right chest s/p excision 2017              -left trapezius s/p excision 2017              -left frontal scalp- s/p mohs 2019              -right lower forehead s/p mohs 3/2020  2. Seborrheic keratosis               - Right infraorbital cheek s/p Bx 5/13/20  3. Actinic Keratosis              - prior LN2 Treatment  4. Papular Rosacea              - Metrocream              - Mild epidermal hyperplasia and perifollicular lymphoplasmacytic infiltrate (consistent with acne/rosacea)  5. CNH - Crux of the left helix, bx 8/25/2020              Improved with change in headphones.   6. Hx DN              -L spine, moderate s/p excision 12/19/2017  7. Per patient precursor to melanoma on hip              -patient estimates 2014    ____________________________________________    Assessment & Plan:    # History of non-melanoma skin cancer, DN and precursor to melanoma per patient  - Discussed the nature and appearance of non-melanoma and melanoma skin cancer  - Recommended sun protection and sun avoidance.    # Right lower eyelid - skin colored papule c/w nevus. Patient interested in removal. Discussed risks of scarring in this area. Patient will consider shave biopsy, and if interested, will remove at next skin exam.     # 5 mm pink macule with telangiectasia c/w actinic keratosis on the right mid cheek. Will  treat with cryo today and confirm resolution at next skin check.   - See cryo procedure note.   - Photo taken today.    # Papular Rosacea - will refill Metrocream today. Discussed that we could consider laser treatments in the future. Patient made aware that insurance is unlikely to cover this procedure. Discussed risks of laser treatment with patient at this time.   - Apply Metrocream daily. Refilled.   - Follow up in 3 months to confirm improvement.    # Hormonal vs inflammatory Acne   - Will restart spironolactone 50 mg daily. If no side effects after 2 weeks, increase to 100 mg daily. Refilled today.  - Follow up in 3 months to confirm improvement.    # Benign findings, including: seborrheic keratosis, dermatofibroma, cherry angioma   - No further intervention needed.    # Multiple clinically benign nevi.   - No further intervention needed.      # Solar lentigines.   - No further intervention needed.       Procedures Performed:   - Cryotherapy procedure note, location(s): right mid cheek. After verbal consent and discussion of risks and benefits including, but not limited to, dyspigmentation/scar, blister, and pain, 1 lesion(s) was(were) treated with 1-2 mm freeze border for 1-2 cycles with liquid nitrogen. Post cryotherapy instructions were provided.    Follow-up: 3 month(s) virtually (telephone with photos), or earlier for new or changing lesions    Staff and Scribe:     Scribe Disclosure:   I, Adalberto Rinaldi, am serving as a scribe to document services personally performed by this physician, Dr. Crow Cervantes, based on data collection and the provider's statements to me.     Provider Disclosure:   The documentation recorded by the scribe accurately reflects the services I personally performed and the decisions made by me.  I personally performed the procedures today.    Crow Cervantes DO    Department of Dermatology  [facility]:  "Phone: [phone], Fax:[phone]  [facility]: Phone: [phone], Fax: [phone]    ____________________________________________    CC: Skin Check (no areas of concern hx NMSC, DN and precursor to melanoma), Rosacea (metrocream), Acne (spirnolactone request refill patient states active break outs during ovulation), and Derm Problem (would like to discuss pore size reduction and black heads)    HPI:  Ms. Jessica Nickerson is a(n) 50 year old female who presents today as a return patient for a skin check.    Last seen 11/9/2020 for a skin check. At that time, a biopsy was taken from the left temple, came back as an inflamed keratosis. She was also restarted on spironolactone 50 mg daily for acne, with the plan to increase to 100mg daily if no side effects. Patient also had bumps on the fingers (ddx flat warts vs verrucous keratosis) to monitor for changes. Lesion on the right shoulder noted to monitor, c/w intradermal nevus.     Today, she is requesting refills of Metrocream and spironolactone for her rosacea and acne. She has not taken spironolactone for at least 6 months, but notes when she had it helped with her acne. She is now having \"boil-like acne\" which has been occurring in the last 6 months. She has noticed some new moles, but has no spots that she is concerned about.     Patient is otherwise feeling well, without additional skin concerns.    Labs Reviewed:  N/A    Physical Exam:  Vitals: There were no vitals taken for this visit.  SKIN: Total skin excluding the undergarment areas was performed. The exam included the head/face, neck, both arms, chest, back, abdomen, both legs, digits and/or nails.   -  5 mm pink macule with telangiectasia on the right mid cheek  - There are dome shaped bright red papules on the trunk and extremities.   - There is a well healed surgical scar without erythema, nodularity or telangiectasias at the sites of prior NMSC and " DN.  - There is a firm tan/flesh colored papule that dimples with lateral pressure on the left thigh.  - There are scattered telangectasias on the bilateral cheeks.   - There are waxy stuck on tan to brown papules on the trunk and extremities.  - Multiple regular brown pigmented macules and papules are identified on the trunk and extremities.    - Scattered brown macules on sun exposed areas.  - Right lower eyelid - skin colored papule  - No other lesions of concern on areas examined.     Medications:  Current Outpatient Medications   Medication     metroNIDAZOLE (METROCREAM) 0.75 % external cream     nicotine (NICORETTE) 4 MG lozenge     spironolactone (ALDACTONE) 50 MG tablet     traZODone HCl 300 MG TABS     doxycycline hyclate (VIBRAMYCIN) 100 MG capsule     fluorouracil (EFUDEX) 5 % external cream     ketoconazole (NIZORAL) 2 % external shampoo     oxyCODONE (OXY-IR) 5 MG capsule     sulfamethoxazole-trimethoprim (BACTRIM/SEPTRA) 8 mg/mL suspension     No current facility-administered medications for this visit.      Past Medical History:   Rosacea  Tobacco abuse        Again, thank you for allowing me to participate in the care of your patient.        Sincerely,        Crow Cervantes MD

## 2021-06-28 NOTE — NURSING NOTE
Jessica Nickerson's goals for this visit include:   Chief Complaint   Patient presents with     Skin Check     no areas of concern hx NMSC, DN and precursor to melanoma     Rosacea     metrocream     Acne     spirnolactone request refill patient states active break outs during ovulation     Derm Problem     would like to discuss pore size reduction and black heads       She requests these members of her care team be copied on today's visit information:     PCP: No Ref-Primary, Physician    Referring Provider:  Referred Self, MD  No address on file    There were no vitals taken for this visit.    Do you need any medication refills at today's visit? Africa Jordan LPN

## 2021-09-02 ENCOUNTER — HOSPITAL ENCOUNTER (OUTPATIENT)
Dept: MAMMOGRAPHY | Facility: CLINIC | Age: 51
Discharge: HOME | End: 2021-09-02
Attending: NURSE PRACTITIONER | Admitting: NURSE PRACTITIONER
Payer: COMMERCIAL

## 2021-09-02 DIAGNOSIS — R92.8 ABNORMAL MAMMOGRAM: ICD-10-CM

## 2021-09-02 PROCEDURE — 76642 ULTRASOUND BREAST LIMITED: CPT | Mod: LT

## 2021-11-12 DIAGNOSIS — L70.0 ACNE VULGARIS: ICD-10-CM

## 2021-11-12 RX ORDER — SPIRONOLACTONE 50 MG/1
TABLET, FILM COATED ORAL
Qty: 60 TABLET | Refills: 2 | Status: SHIPPED | OUTPATIENT
Start: 2021-11-12

## 2021-11-12 NOTE — TELEPHONE ENCOUNTER
spironolactone (ALDACTONE) 50 MG tablet  Last Written Prescription Date:  6/28/21  Last Fill Quantity: 60,   # refills: 3  Last Office Visit :6/28/21  Future Office visit: 1/3/22    Process 2

## 2022-04-18 ENCOUNTER — APPOINTMENT (OUTPATIENT)
Dept: URBAN - METROPOLITAN AREA CLINIC 255 | Age: 52
Setting detail: DERMATOLOGY
End: 2022-05-02

## 2022-04-18 DIAGNOSIS — L57.0 ACTINIC KERATOSIS: ICD-10-CM

## 2022-04-18 DIAGNOSIS — L82.1 OTHER SEBORRHEIC KERATOSIS: ICD-10-CM

## 2022-04-18 DIAGNOSIS — D18.0 HEMANGIOMA: ICD-10-CM

## 2022-04-18 DIAGNOSIS — D22 MELANOCYTIC NEVI: ICD-10-CM

## 2022-04-18 DIAGNOSIS — L70.0 ACNE VULGARIS: ICD-10-CM

## 2022-04-18 DIAGNOSIS — Z85.828 PERSONAL HISTORY OF OTHER MALIGNANT NEOPLASM OF SKIN: ICD-10-CM

## 2022-04-18 DIAGNOSIS — L81.4 OTHER MELANIN HYPERPIGMENTATION: ICD-10-CM

## 2022-04-18 DIAGNOSIS — Z85.820 PERSONAL HISTORY OF MALIGNANT MELANOMA OF SKIN: ICD-10-CM

## 2022-04-18 PROBLEM — D22.5 MELANOCYTIC NEVI OF TRUNK: Status: ACTIVE | Noted: 2022-04-18

## 2022-04-18 PROBLEM — D18.01 HEMANGIOMA OF SKIN AND SUBCUTANEOUS TISSUE: Status: ACTIVE | Noted: 2022-04-18

## 2022-04-18 PROCEDURE — OTHER COUNSELING: OTHER

## 2022-04-18 PROCEDURE — OTHER PRESCRIPTION: OTHER

## 2022-04-18 PROCEDURE — OTHER LIQUID NITROGEN: OTHER

## 2022-04-18 PROCEDURE — OTHER MIPS QUALITY: OTHER

## 2022-04-18 PROCEDURE — 99204 OFFICE O/P NEW MOD 45 MIN: CPT | Mod: 25

## 2022-04-18 PROCEDURE — 17000 DESTRUCT PREMALG LESION: CPT

## 2022-04-18 RX ORDER — TRETIONIN 0.5 MG/G
CREAM TOPICAL QHS
Qty: 20 | Refills: 3 | Status: ERX | COMMUNITY
Start: 2022-04-18

## 2022-04-18 ASSESSMENT — LOCATION SIMPLE DESCRIPTION DERM
LOCATION SIMPLE: LEFT FOREHEAD
LOCATION SIMPLE: RIGHT SHOULDER
LOCATION SIMPLE: LEFT UPPER BACK
LOCATION SIMPLE: LEFT FOREHEAD
LOCATION SIMPLE: RIGHT UPPER BACK
LOCATION SIMPLE: UPPER BACK

## 2022-04-18 ASSESSMENT — LOCATION DETAILED DESCRIPTION DERM
LOCATION DETAILED: RIGHT MEDIAL UPPER BACK
LOCATION DETAILED: LEFT SUPERIOR FOREHEAD
LOCATION DETAILED: LEFT SUPERIOR MEDIAL UPPER BACK
LOCATION DETAILED: RIGHT ANTERIOR SHOULDER
LOCATION DETAILED: SUPERIOR THORACIC SPINE
LOCATION DETAILED: LEFT SUPERIOR FOREHEAD
LOCATION DETAILED: RIGHT INFERIOR MEDIAL UPPER BACK

## 2022-04-18 ASSESSMENT — LOCATION ZONE DERM
LOCATION ZONE: FACE
LOCATION ZONE: TRUNK
LOCATION ZONE: ARM
LOCATION ZONE: FACE

## 2022-04-18 NOTE — PROCEDURE: LIQUID NITROGEN
Duration Of Freeze Thaw-Cycle (Seconds): 10
Show Aperture Variable?: Yes
Number Of Freeze-Thaw Cycles: 1 freeze-thaw cycle
Detail Level: Detailed
Render Note In Bullet Format When Appropriate: No
Consent: The patient's consent was obtained including but not limited to risks of crusting, scabbing, blistering, scarring, darker or lighter pigmentary change, recurrence, incomplete removal and infection.
Post-Care Instructions: I reviewed with the patient in detail post-care instructions. Patient is to wear sunprotection, and avoid picking at any of the treated lesions. Pt may apply Vaseline to crusted or scabbing areas.

## 2022-04-18 NOTE — PROCEDURE: MIPS QUALITY
Quality 137: Melanoma: Continuity Of Care - Recall System: Patient information entered into a recall system that includes: target date for the next exam specified AND a process to follow up with patients regarding missed or unscheduled appointments
Quality 130: Documentation Of Current Medications In The Medical Record: Current Medications Documented
Quality 226: Preventive Care And Screening: Tobacco Use: Screening And Cessation Intervention: Patient screened for tobacco use and is an ex/non-smoker
Quality 138: Melanoma: Coordination Of Care: A treatment plan was communicated to the physicians providing continuing care within one month of diagnosis outlining: diagnosis, tumor thickness and a plan for surgery or alternate care.
Detail Level: Detailed
Quality 110: Preventive Care And Screening: Influenza Immunization: Influenza Immunization not Administered because Patient Refused.
Quality 431: Preventive Care And Screening: Unhealthy Alcohol Use - Screening: Patient not identified as an unhealthy alcohol user when screened for unhealthy alcohol use using a systematic screening method

## 2022-04-18 NOTE — PROCEDURE: COUNSELING
Tetracycline Pregnancy And Lactation Text: This medication is Pregnancy Category D and not consider safe during pregnancy. It is also excreted in breast milk.
Birth Control Pills Counseling: Birth Control Pill Counseling: I discussed with the patient the potential side effects of OCPs including but not limited to increased risk of stroke, heart attack, thrombophlebitis, deep venous thrombosis, hepatic adenomas, breast changes, GI upset, headaches, and depression.  The patient verbalized understanding of the proper use and possible adverse effects of OCPs. All of the patient's questions and concerns were addressed.
High Dose Vitamin A Pregnancy And Lactation Text: High dose vitamin A therapy is contraindicated during pregnancy and breast feeding.
Bactrim Pregnancy And Lactation Text: This medication is Pregnancy Category D and is known to cause fetal risk.  It is also excreted in breast milk.
Azelaic Acid Counseling: Patient counseled that medicine may cause skin irritation and to avoid applying near the eyes.  In the event of skin irritation, the patient was advised to reduce the amount of the drug applied or use it less frequently.   The patient verbalized understanding of the proper use and possible adverse effects of azelaic acid.  All of the patient's questions and concerns were addressed.
Detail Level: Detailed
Benzoyl Peroxide Counseling: Patient counseled that medicine may cause skin irritation and bleach clothing.  In the event of skin irritation, the patient was advised to reduce the amount of the drug applied or use it less frequently.   The patient verbalized understanding of the proper use and possible adverse effects of benzoyl peroxide.  All of the patient's questions and concerns were addressed.
Tetracycline Counseling: Patient counseled regarding possible photosensitivity and increased risk for sunburn.  Patient instructed to avoid sunlight, if possible.  When exposed to sunlight, patients should wear protective clothing, sunglasses, and sunscreen.  The patient was instructed to call the office immediately if the following severe adverse effects occur:  hearing changes, easy bruising/bleeding, severe headache, or vision changes.  The patient verbalized understanding of the proper use and possible adverse effects of tetracycline.  All of the patient's questions and concerns were addressed. Patient understands to avoid pregnancy while on therapy due to potential birth defects.
Topical Sulfur Applications Pregnancy And Lactation Text: This medication is Pregnancy Category C and has an unknown safety profile during pregnancy. It is unknown if this topical medication is excreted in breast milk.
Azithromycin Pregnancy And Lactation Text: This medication is considered safe during pregnancy and is also secreted in breast milk.
Azithromycin Counseling:  I discussed with the patient the risks of azithromycin including but not limited to GI upset, allergic reaction, drug rash, diarrhea, and yeast infections.
Bactrim Counseling:  I discussed with the patient the risks of sulfa antibiotics including but not limited to GI upset, allergic reaction, drug rash, diarrhea, dizziness, photosensitivity, and yeast infections.  Rarely, more serious reactions can occur including but not limited to aplastic anemia, agranulocytosis, methemoglobinemia, blood dyscrasias, liver or kidney failure, lung infiltrates or desquamative/blistering drug rashes.
Winlevi Counseling:  I discussed with the patient the risks of topical clascoterone including but not limited to erythema, scaling, itching, and stinging. Patient voiced their understanding.
Topical Retinoid counseling:  Patient advised to apply a pea-sized amount only at bedtime and wait 30 minutes after washing their face before applying.  If too drying, patient may add a non-comedogenic moisturizer. The patient verbalized understanding of the proper use and possible adverse effects of retinoids.  All of the patient's questions and concerns were addressed.
Detail Level: Zone
Use Enhanced Medication Counseling?: No
Detail Level: Generalized
Doxycycline Pregnancy And Lactation Text: This medication is Pregnancy Category D and not consider safe during pregnancy. It is also excreted in breast milk but is considered safe for shorter treatment courses.
Isotretinoin Counseling: Patient should get monthly blood tests, not donate blood, not drive at night if vision affected, not share medication, and not undergo elective surgery for 6 months after tx completed. Side effects reviewed, pt to contact office should one occur.
High Dose Vitamin A Counseling: Side effects reviewed, pt to contact office should one occur.
Doxycycline Counseling:  Patient counseled regarding possible photosensitivity and increased risk for sunburn.  Patient instructed to avoid sunlight, if possible.  When exposed to sunlight, patients should wear protective clothing, sunglasses, and sunscreen.  The patient was instructed to call the office immediately if the following severe adverse effects occur:  hearing changes, easy bruising/bleeding, severe headache, or vision changes.  The patient verbalized understanding of the proper use and possible adverse effects of doxycycline.  All of the patient's questions and concerns were addressed.
Erythromycin Counseling:  I discussed with the patient the risks of erythromycin including but not limited to GI upset, allergic reaction, drug rash, diarrhea, increase in liver enzymes, and yeast infections.
Tazorac Counseling:  Patient advised that medication is irritating and drying.  Patient may need to apply sparingly and wash off after an hour before eventually leaving it on overnight.  The patient verbalized understanding of the proper use and possible adverse effects of tazorac.  All of the patient's questions and concerns were addressed.
Topical Sulfur Applications Counseling: Topical Sulfur Counseling: Patient counseled that this medication may cause skin irritation or allergic reactions.  In the event of skin irritation, the patient was advised to reduce the amount of the drug applied or use it less frequently.   The patient verbalized understanding of the proper use and possible adverse effects of topical sulfur application.  All of the patient's questions and concerns were addressed.
Erythromycin Pregnancy And Lactation Text: This medication is Pregnancy Category B and is considered safe during pregnancy. It is also excreted in breast milk.
Topical Retinoid Pregnancy And Lactation Text: This medication is Pregnancy Category C. It is unknown if this medication is excreted in breast milk.
Dapsone Pregnancy And Lactation Text: This medication is Pregnancy Category C and is not considered safe during pregnancy or breast feeding.
Patient Specific Counseling (Will Not Stick From Patient To Patient): Patient instructed to cleanse, moisturize, then apply Tretinoin.
Spironolactone Pregnancy And Lactation Text: This medication can cause feminization of the male fetus and should be avoided during pregnancy. The active metabolite is also found in breast milk.
Topical Clindamycin Counseling: Patient counseled that this medication may cause skin irritation or allergic reactions.  In the event of skin irritation, the patient was advised to reduce the amount of the drug applied or use it less frequently.   The patient verbalized understanding of the proper use and possible adverse effects of clindamycin.  All of the patient's questions and concerns were addressed.
Minocycline Counseling: Patient advised regarding possible photosensitivity and discoloration of the teeth, skin, lips, tongue and gums.  Patient instructed to avoid sunlight, if possible.  When exposed to sunlight, patients should wear protective clothing, sunglasses, and sunscreen.  The patient was instructed to call the office immediately if the following severe adverse effects occur:  hearing changes, easy bruising/bleeding, severe headache, or vision changes.  The patient verbalized understanding of the proper use and possible adverse effects of minocycline.  All of the patient's questions and concerns were addressed.
Birth Control Pills Pregnancy And Lactation Text: This medication should be avoided if pregnant and for the first 30 days post-partum.
Dapsone Counseling: I discussed with the patient the risks of dapsone including but not limited to hemolytic anemia, agranulocytosis, rashes, methemoglobinemia, kidney failure, peripheral neuropathy, headaches, GI upset, and liver toxicity.  Patients who start dapsone require monitoring including baseline LFTs and weekly CBCs for the first month, then every month thereafter.  The patient verbalized understanding of the proper use and possible adverse effects of dapsone.  All of the patient's questions and concerns were addressed.
Benzoyl Peroxide Pregnancy And Lactation Text: This medication is Pregnancy Category C. It is unknown if benzoyl peroxide is excreted in breast milk.
Azelaic Acid Pregnancy And Lactation Text: This medication is considered safe during pregnancy and breast feeding.
Isotretinoin Pregnancy And Lactation Text: This medication is Pregnancy Category X and is considered extremely dangerous during pregnancy. It is unknown if it is excreted in breast milk.
Tazorac Pregnancy And Lactation Text: This medication is not safe during pregnancy. It is unknown if this medication is excreted in breast milk.
Winlevi Pregnancy And Lactation Text: This medication is considered safe during pregnancy and breastfeeding.
Topical Clindamycin Pregnancy And Lactation Text: This medication is Pregnancy Category B and is considered safe during pregnancy. It is unknown if it is excreted in breast milk.
Sarecycline Counseling: Patient advised regarding possible photosensitivity and discoloration of the teeth, skin, lips, tongue and gums.  Patient instructed to avoid sunlight, if possible.  When exposed to sunlight, patients should wear protective clothing, sunglasses, and sunscreen.  The patient was instructed to call the office immediately if the following severe adverse effects occur:  hearing changes, easy bruising/bleeding, severe headache, or vision changes.  The patient verbalized understanding of the proper use and possible adverse effects of sarecycline.  All of the patient's questions and concerns were addressed.
Spironolactone Counseling: Patient advised regarding risks of diarrhea, abdominal pain, hyperkalemia, birth defects (for female patients), liver toxicity and renal toxicity. The patient may need blood work to monitor liver and kidney function and potassium levels while on therapy. The patient verbalized understanding of the proper use and possible adverse effects of spironolactone.  All of the patient's questions and concerns were addressed.

## 2022-10-26 RX ORDER — TRETIONIN 0.5 MG/G
CREAM TOPICAL QHS
Qty: 20 | Refills: 3 | Status: ERX

## (undated) DEVICE — SPONGE LAP 18X18" 1515

## (undated) DEVICE — DRAPE U SPLIT 74X120" 29440

## (undated) DEVICE — DRSG XEROFORM 5X9" 8884431605

## (undated) DEVICE — DRAPE STERI INCISE 1050

## (undated) DEVICE — SU CHROMIC 3-0 PS-2 27" 1638H

## (undated) DEVICE — GLOVE PROTEXIS W/NEU-THERA 7.0  2D73TE70

## (undated) DEVICE — DRSG COTTON BALL 6PK LCB62

## (undated) DEVICE — PREP SKIN SCRUB TRAY 4461A

## (undated) DEVICE — PACK MINOR CUSTOM ASC

## (undated) DEVICE — SUCTION MANIFOLD NEPTUNE 2 SYS 1 PORT 702-025-000

## (undated) DEVICE — PREP CHLORAPREP 26ML TINTED ORANGE  260815

## (undated) DEVICE — PREP POVIDONE IODINE SOLUTION 10% 120ML

## (undated) DEVICE — SUCTION TIP YANKAUER STR K87

## (undated) DEVICE — LINEN TOWEL PACK X5 5464

## (undated) DEVICE — SU MONOCRYL 2-0 SH 27" UND Y417H

## (undated) DEVICE — ADH SKIN CLOSURE PREMIERPRO EXOFIN 1.0ML 3470

## (undated) RX ORDER — OXYCODONE HYDROCHLORIDE 5 MG/1
TABLET ORAL
Status: DISPENSED
Start: 2019-11-18

## (undated) RX ORDER — MINERAL OIL
OIL (ML) MISCELLANEOUS
Status: DISPENSED
Start: 2019-11-18

## (undated) RX ORDER — CEFAZOLIN SODIUM 1 G/3ML
INJECTION, POWDER, FOR SOLUTION INTRAMUSCULAR; INTRAVENOUS
Status: DISPENSED
Start: 2019-11-18

## (undated) RX ORDER — FENTANYL CITRATE 50 UG/ML
INJECTION, SOLUTION INTRAMUSCULAR; INTRAVENOUS
Status: DISPENSED
Start: 2019-11-18

## (undated) RX ORDER — BUPIVACAINE HYDROCHLORIDE 2.5 MG/ML
INJECTION, SOLUTION EPIDURAL; INFILTRATION; INTRACAUDAL
Status: DISPENSED
Start: 2019-11-18